# Patient Record
Sex: MALE | Race: WHITE | NOT HISPANIC OR LATINO | Employment: FULL TIME | URBAN - METROPOLITAN AREA
[De-identification: names, ages, dates, MRNs, and addresses within clinical notes are randomized per-mention and may not be internally consistent; named-entity substitution may affect disease eponyms.]

---
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
## 2020-02-05 ENCOUNTER — APPOINTMENT (OUTPATIENT)
Dept: LAB | Facility: HOSPITAL | Age: 20
End: 2020-02-05

## 2020-02-05 ENCOUNTER — OFFICE VISIT (OUTPATIENT)
Dept: FAMILY MEDICINE CLINIC | Facility: CLINIC | Age: 20
End: 2020-02-05

## 2020-02-05 ENCOUNTER — HOSPITAL ENCOUNTER (OUTPATIENT)
Dept: CT IMAGING | Facility: HOSPITAL | Age: 20
Discharge: HOME | End: 2020-02-05
Admitting: NURSE PRACTITIONER

## 2020-02-05 ENCOUNTER — HOSPITAL ENCOUNTER (EMERGENCY)
Facility: HOSPITAL | Age: 20
End: 2020-02-05

## 2020-02-05 VITALS
OXYGEN SATURATION: 98 % | HEART RATE: 72 BPM | TEMPERATURE: 97.5 F | DIASTOLIC BLOOD PRESSURE: 88 MMHG | BODY MASS INDEX: 28.38 KG/M2 | RESPIRATION RATE: 16 BRPM | SYSTOLIC BLOOD PRESSURE: 135 MMHG | HEIGHT: 70 IN

## 2020-02-05 VITALS
HEIGHT: 70 IN | OXYGEN SATURATION: 98 % | SYSTOLIC BLOOD PRESSURE: 134 MMHG | HEART RATE: 78 BPM | BODY MASS INDEX: 28.32 KG/M2 | DIASTOLIC BLOOD PRESSURE: 78 MMHG | TEMPERATURE: 98.1 F | WEIGHT: 197.8 LBS

## 2020-02-05 DIAGNOSIS — J30.89 NON-SEASONAL ALLERGIC RHINITIS, UNSPECIFIED TRIGGER: ICD-10-CM

## 2020-02-05 DIAGNOSIS — R10.31 RIGHT LOWER QUADRANT ABDOMINAL PAIN: Primary | ICD-10-CM

## 2020-02-05 LAB
ANION GAP SERPL CALCULATED.3IONS-SCNC: 9.8 MMOL/L (ref 5–15)
BASOPHILS # BLD AUTO: 0.02 10*3/MM3 (ref 0–0.2)
BASOPHILS NFR BLD AUTO: 0.2 % (ref 0–1.5)
BILIRUB BLD-MCNC: NEGATIVE MG/DL
BUN BLD-MCNC: 19 MG/DL (ref 6–20)
BUN/CREAT SERPL: 21.3 (ref 7–25)
CALCIUM SPEC-SCNC: 9.7 MG/DL (ref 8.6–10.5)
CHLORIDE SERPL-SCNC: 99 MMOL/L (ref 98–107)
CLARITY, POC: CLEAR
CO2 SERPL-SCNC: 31.2 MMOL/L (ref 22–29)
COLOR UR: YELLOW
CREAT BLD-MCNC: 0.89 MG/DL (ref 0.76–1.27)
DEPRECATED RDW RBC AUTO: 37.2 FL (ref 37–54)
EOSINOPHIL # BLD AUTO: 0.08 10*3/MM3 (ref 0–0.4)
EOSINOPHIL NFR BLD AUTO: 0.7 % (ref 0.3–6.2)
ERYTHROCYTE [DISTWIDTH] IN BLOOD BY AUTOMATED COUNT: 11.4 % (ref 12.3–15.4)
GFR SERPL CREATININE-BSD FRML MDRD: 110 ML/MIN/1.73
GLUCOSE BLD-MCNC: 91 MG/DL (ref 65–99)
GLUCOSE UR STRIP-MCNC: NEGATIVE MG/DL
HCT VFR BLD AUTO: 42.4 % (ref 37.5–51)
HGB BLD-MCNC: 14.5 G/DL (ref 13–17.7)
IMM GRANULOCYTES # BLD AUTO: 0.04 10*3/MM3 (ref 0–0.05)
IMM GRANULOCYTES NFR BLD AUTO: 0.3 % (ref 0–0.5)
KETONES UR QL: NEGATIVE
LEUKOCYTE EST, POC: NEGATIVE
LYMPHOCYTES # BLD AUTO: 1.61 10*3/MM3 (ref 0.7–3.1)
LYMPHOCYTES NFR BLD AUTO: 13.5 % (ref 19.6–45.3)
MCH RBC QN AUTO: 30.7 PG (ref 26.6–33)
MCHC RBC AUTO-ENTMCNC: 34.2 G/DL (ref 31.5–35.7)
MCV RBC AUTO: 89.6 FL (ref 79–97)
MONOCYTES # BLD AUTO: 0.98 10*3/MM3 (ref 0.1–0.9)
MONOCYTES NFR BLD AUTO: 8.2 % (ref 5–12)
NEUTROPHILS # BLD AUTO: 9.24 10*3/MM3 (ref 1.7–7)
NEUTROPHILS NFR BLD AUTO: 77.1 % (ref 42.7–76)
NITRITE UR-MCNC: NEGATIVE MG/ML
NRBC BLD AUTO-RTO: 0 /100 WBC (ref 0–0.2)
PH UR: 7 [PH] (ref 5–8)
PLATELET # BLD AUTO: 205 10*3/MM3 (ref 140–450)
PMV BLD AUTO: 9.4 FL (ref 6–12)
POTASSIUM BLD-SCNC: 4.2 MMOL/L (ref 3.5–5.2)
PROT UR STRIP-MCNC: NEGATIVE MG/DL
RBC # BLD AUTO: 4.73 10*6/MM3 (ref 4.14–5.8)
RBC # UR STRIP: NEGATIVE /UL
SODIUM BLD-SCNC: 140 MMOL/L (ref 136–145)
SP GR UR: 1 (ref 1–1.03)
UROBILINOGEN UR QL: NORMAL
WBC NRBC COR # BLD: 11.97 10*3/MM3 (ref 3.4–10.8)

## 2020-02-05 PROCEDURE — 74177 CT ABD & PELVIS W/CONTRAST: CPT

## 2020-02-05 PROCEDURE — 85025 COMPLETE CBC W/AUTO DIFF WBC: CPT | Performed by: NURSE PRACTITIONER

## 2020-02-05 PROCEDURE — 80048 BASIC METABOLIC PNL TOTAL CA: CPT | Performed by: NURSE PRACTITIONER

## 2020-02-05 PROCEDURE — 25010000002 IOPAMIDOL 61 % SOLUTION: Performed by: NURSE PRACTITIONER

## 2020-02-05 PROCEDURE — 99214 OFFICE O/P EST MOD 30 MIN: CPT | Performed by: NURSE PRACTITIONER

## 2020-02-05 PROCEDURE — 81003 URINALYSIS AUTO W/O SCOPE: CPT | Performed by: NURSE PRACTITIONER

## 2020-02-05 PROCEDURE — 36415 COLL VENOUS BLD VENIPUNCTURE: CPT | Performed by: NURSE PRACTITIONER

## 2020-02-05 RX ADMIN — IOPAMIDOL 85 ML: 612 INJECTION, SOLUTION INTRAVENOUS at 14:10

## 2020-02-05 NOTE — PROGRESS NOTES
"Subjective   Hua Medina is a 19 y.o. male.     Chief Complaint   Patient presents with   • Abdominal Pain     lower/right side constant sharp pain    • Back Pain     lower/right side       HPI  Here for acute RLQ pain. Noticed some vague tenderness there for couple of weeks but didn't really pay much attention. Last night he developed worsening and sharper pain which woke him up last night.  He also has had nausea since last night but no vomiting and has not had any appetite.     Has his appendix, not had other surgeries.       Social History     Tobacco Use   • Smoking status: Never Smoker   • Smokeless tobacco: Never Used   Substance Use Topics   • Alcohol use: No     Frequency: Never   • Drug use: No       The following portions of the patient's history were reviewed and updated as appropriate: allergies, current medications, past family history, past medical history, past social history, past surgical history and problem list.    Review of Systems   Constitutional: Positive for appetite change. Negative for chills and fever.   HENT: Positive for rhinorrhea (constant).    Respiratory: Negative for cough and shortness of breath.    Cardiovascular: Negative for chest pain and palpitations.   Gastrointestinal: Negative for abdominal distention, anal bleeding, blood in stool, constipation, diarrhea, rectal pain and vomiting.   Genitourinary: Positive for dysuria (couple of weeks ago, but resolved on its own). Negative for decreased urine volume, discharge, flank pain, genital sores, hematuria, scrotal swelling, testicular pain and urgency.   Musculoskeletal: Positive for back pain (intermittent Right lower back pain worsened since RLQ pain began).   Allergic/Immunologic: Positive for environmental allergies.   Neurological: Negative for weakness and headaches.       Objective   Blood pressure 134/78, pulse 78, temperature 98.1 °F (36.7 °C), temperature source Oral, height 177.8 cm (70\"), weight 89.7 kg (197 lb " 12.8 oz), SpO2 98 %.  Body mass index is 28.38 kg/m².    Physical Exam   Constitutional: He is oriented to person, place, and time. He appears well-developed and well-nourished. No distress.   Non toxic   HENT:   Head: Normocephalic and atraumatic.   Raised lesion right distal nare with small scab     Eyes: Conjunctivae are normal. Right eye exhibits no discharge. Left eye exhibits no discharge.   Cardiovascular: Normal rate and regular rhythm.   Pulmonary/Chest: Effort normal and breath sounds normal.   Abdominal: Soft. Normal appearance. He exhibits no distension. Bowel sounds are decreased. There is tenderness in the right lower quadrant. There is rebound. There is no guarding and no CVA tenderness.   Reported pain at McBurneys point but pain not exquisite  Mildly + Rosvings sign  Mildly positive drop heel  Mildly + obturator sign    ,   Musculoskeletal: He exhibits no deformity.   Gait smooth and steady   Neurological: He is alert and oriented to person, place, and time.   Skin: Skin is warm and dry. He is not diaphoretic.   Psychiatric: He has a normal mood and affect.   Nursing note and vitals reviewed.      Assessment   Problem List Items Addressed This Visit     None      Visit Diagnoses     Right lower quadrant abdominal pain    -  Primary    Relevant Orders    POC Urinalysis Dipstick, Automated (Completed)    CBC & Differential (Completed)    Basic Metabolic Panel (Completed)    CT Abdomen Pelvis With Contrast    CBC Auto Differential (Completed)    Non-seasonal allergic rhinitis, unspecified trigger               Procedures           Impression and Plan:  Villagomez score of 8-needs further eval for appendicitis.  Stat CT with IV contrast of abd/pelvis ordered.      UA was negative.  BMP and CBC done while here-BMP was normal and CBC shows mild leukocytosis with increased neutrophils.    Pt was scheduled for CT this evening had been NPO but then had protein shake in waiting room.  He has been instructed to  remain NPO until CT scan.  If he worsens he should go immediately to ER.      Right nare with scab vs polyp-will have him apply abx ointment TID for few days, avoid harsh blowing, wiping to see if scab heals. Recommend OTC cetirizine for rhinorrhea.   May need ENT eval if continues to be problem.     Health Maintenance Due   Topic Date Due   • ANNUAL PHYSICAL  05/05/2003   • HPV VACCINES (1 - Male 2-dose series) 05/05/2011   • TDAP/TD VACCINES (1 - Tdap) 05/05/2011   • INFLUENZA VACCINE  08/01/2019       Late entry:  Results of CT scan which was negative called to patient when verbal report called.  Discussed monitoring, s/s requiring emergent tx.  Will have him f/u if no better.        EMR Dragon/Transcription disclaimer:   Much of this encounter note is an electronic transcription/translation of spoken language to printed text. The electronic translation of spoken language may permit erroneous, or at times, nonsensical words or phrases to be inadvertently transcribed; Although I have reviewed the note for such errors, some may still exist.

## 2020-02-05 NOTE — NURSING NOTE
Dr. Crain phoned here and stated CT is negative.  Brit Cruz phoned and informed of this result. She then spoke to patient directly on the telephone and told him that he could be discharged from the hospital and to follow up with her if his symptoms worsen, IV out, ambulatory with family member toward the exit.

## 2020-07-22 ENCOUNTER — OFFICE VISIT (OUTPATIENT)
Dept: FAMILY MEDICINE CLINIC | Facility: CLINIC | Age: 20
End: 2020-07-22

## 2020-07-22 VITALS
SYSTOLIC BLOOD PRESSURE: 110 MMHG | WEIGHT: 204 LBS | TEMPERATURE: 97.7 F | HEIGHT: 70 IN | DIASTOLIC BLOOD PRESSURE: 62 MMHG | HEART RATE: 73 BPM | BODY MASS INDEX: 29.2 KG/M2 | OXYGEN SATURATION: 98 %

## 2020-07-22 DIAGNOSIS — K05.00 GINGIVITIS, ACUTE: ICD-10-CM

## 2020-07-22 DIAGNOSIS — J02.9 PHARYNGITIS, UNSPECIFIED ETIOLOGY: ICD-10-CM

## 2020-07-22 DIAGNOSIS — R59.0 CERVICAL ADENOPATHY: Primary | ICD-10-CM

## 2020-07-22 PROCEDURE — 99213 OFFICE O/P EST LOW 20 MIN: CPT | Performed by: NURSE PRACTITIONER

## 2020-07-22 RX ORDER — AMOXICILLIN 500 MG/1
500 CAPSULE ORAL 2 TIMES DAILY
Qty: 20 CAPSULE | Refills: 0 | Status: SHIPPED | OUTPATIENT
Start: 2020-07-22 | End: 2020-07-28

## 2020-07-22 NOTE — PROGRESS NOTES
"Suki Medina is a 20 y.o. male.     Chief Complaint   Patient presents with   • Edema     C/o neck swelling on the left side x2 days, denies any injury, complains of drainage      HPI  Here for onset of Left ant cervical lymph node becoming  swollen and painful on  Monday, Developed sore throat last night. No dental problems and gets regular check ups.  Brushes but does not floss. He does not note any sinus drainage just thinks he must since he has a sore throat.     Denies COVID exposure or sick contacts.  Does work out at gym 4-5 days per week.  Wears mask in and out but not during lifting.  Tries to practice social distancing.        Social History     Tobacco Use   • Smoking status: Never Smoker   • Smokeless tobacco: Never Used   Substance Use Topics   • Alcohol use: No     Frequency: Never   • Drug use: No       The following portions of the patient's history were reviewed and updated as appropriate: allergies, current medications, past family history, past medical history, past social history, past surgical history and problem list.    Review of Systems   Constitutional: Negative for activity change, appetite change, chills and fever.   HENT: Positive for sore throat and trouble swallowing. Negative for congestion, ear discharge, ear pain, facial swelling, postnasal drip, rhinorrhea, sinus pressure and sinus pain.    Respiratory: Negative for cough and shortness of breath.    Cardiovascular: Negative for chest pain and palpitations.   Gastrointestinal: Negative for abdominal pain, diarrhea, nausea and vomiting.   Musculoskeletal: Negative for arthralgias and myalgias.   Neurological: Negative for weakness and headaches.   Hematological: Positive for adenopathy.       Objective   Blood pressure 110/62, pulse 73, temperature 97.7 °F (36.5 °C), height 177.8 cm (70\"), weight 92.5 kg (204 lb), SpO2 98 %.  Body mass index is 29.27 kg/m².    Physical Exam   Constitutional: He is oriented to person, " place, and time. He appears well-developed and well-nourished. No distress.   Mildly ill appearing-face is a little flushed   HENT:   Head: Normocephalic and atraumatic.   Right Ear: External ear normal. Tympanic membrane is erythematous. A middle ear effusion is present.   Left Ear: External ear normal. A middle ear effusion is present.   Mouth/Throat: Posterior oropharyngeal erythema present. Tonsils are 1+ on the right. Tonsils are 1+ on the left. No tonsillar exudate.   Eyes: Conjunctivae are normal. Right eye exhibits no discharge. Left eye exhibits no discharge.   Neck: Neck supple.   Cardiovascular: Normal rate, regular rhythm and normal heart sounds.   Pulmonary/Chest: Effort normal and breath sounds normal.   Abdominal: Soft. Bowel sounds are normal. There is no tenderness.   Musculoskeletal: He exhibits no deformity.   Gait smooth and steady   Lymphadenopathy:     He has cervical adenopathy (left ant cervical).   Neurological: He is alert and oriented to person, place, and time.   Skin: Skin is warm and dry. He is not diaphoretic.   Psychiatric: He has a normal mood and affect.   Nursing note and vitals reviewed.      Assessment   Problem List Items Addressed This Visit     None      Visit Diagnoses     Cervical adenopathy    -  Primary    Relevant Medications    amoxicillin (AMOXIL) 500 MG capsule    Pharyngitis, unspecified etiology        Relevant Medications    amoxicillin (AMOXIL) 500 MG capsule    Other Relevant Orders    COVID-19,[facility],NP/OP Swab in Transport Media or ESwab 72 HR TAT - Swab, Nasopharynx    Gingivitis, acute               Procedures           Impression and Plan: Covid test due to pharyngitis. Will give amox for LAD and has swollen left upper molar gum. Oral hygiene and gingivitis management discussed. If he develops any oral pain he needs to contact his dentist.     Should quarantine until COVID test is back.  We discussed this today.     He is due for annual and would like to  discuss ADD eval, anxiety, sleep problems.       Health Maintenance Due   Topic Date Due   • ANNUAL PHYSICAL  05/05/2003   • HPV VACCINES (1 - Male 2-dose series) 05/05/2011   • TDAP/TD VACCINES (1 - Tdap) 05/05/2011   • HEPATITIS C SCREENING  08/02/2018              EMR Dragon/Transcription disclaimer:   Much of this encounter note is an electronic transcription/translation of spoken language to printed text. The electronic translation of spoken language may permit erroneous, or at times, nonsensical words or phrases to be inadvertently transcribed; Although I have reviewed the note for such errors, some may still exist.

## 2020-07-22 NOTE — PATIENT INSTRUCTIONS

## 2020-07-28 ENCOUNTER — OFFICE VISIT (OUTPATIENT)
Dept: FAMILY MEDICINE CLINIC | Facility: CLINIC | Age: 20
End: 2020-07-28

## 2020-07-28 ENCOUNTER — TELEPHONE (OUTPATIENT)
Dept: FAMILY MEDICINE CLINIC | Facility: CLINIC | Age: 20
End: 2020-07-28

## 2020-07-28 VITALS
SYSTOLIC BLOOD PRESSURE: 110 MMHG | WEIGHT: 201.1 LBS | TEMPERATURE: 97.5 F | OXYGEN SATURATION: 96 % | HEIGHT: 70 IN | DIASTOLIC BLOOD PRESSURE: 80 MMHG | BODY MASS INDEX: 28.79 KG/M2 | HEART RATE: 75 BPM

## 2020-07-28 DIAGNOSIS — J02.9 PHARYNGITIS, UNSPECIFIED ETIOLOGY: Primary | ICD-10-CM

## 2020-07-28 PROCEDURE — 99213 OFFICE O/P EST LOW 20 MIN: CPT | Performed by: NURSE PRACTITIONER

## 2020-07-28 RX ORDER — AMOXICILLIN AND CLAVULANATE POTASSIUM 875; 125 MG/1; MG/1
1 TABLET, FILM COATED ORAL 2 TIMES DAILY
Qty: 14 TABLET | Refills: 0 | Status: SHIPPED | OUTPATIENT
Start: 2020-07-28 | End: 2020-11-30

## 2020-07-28 NOTE — PROGRESS NOTES
"Subjective   Hua Medina is a 20 y.o. male.     Chief Complaint   Patient presents with   • Sore Throat     COVID negative but still has sore throat. Needs note for work      HPI patient returns today for continued sore throat.  He was here on 722 for same.  He feels that his throat is more swollen and that his uvula is swollen.  He was tested for COVID which was negative.  He has taken about half of the amoxicillin and feels that his pharyngitis has improved with amox, but still not resolving as he would expect.  He feels like he still has left anterior cervical lymphadenopathy and a sore throat deep in his throat.  He denies fever chills, fatigue, earache, dental disease.  He denies any sick contacts.  He is requesting a different antibiotic.    Social History     Tobacco Use   • Smoking status: Never Smoker   • Smokeless tobacco: Never Used   Substance Use Topics   • Alcohol use: No     Frequency: Never   • Drug use: No       The following portions of the patient's history were reviewed and updated as appropriate: allergies, current medications, past family history, past medical history, past social history, past surgical history and problem list.    Review of Systems   Constitutional: Negative for activity change and appetite change.   HENT: Negative for congestion, drooling, ear discharge, ear pain, facial swelling, postnasal drip, rhinorrhea, sinus pressure, sinus pain and trouble swallowing.    Eyes: Negative for discharge, redness and itching.   Respiratory: Negative for cough, chest tightness and shortness of breath.    Cardiovascular: Negative for chest pain and palpitations.   Gastrointestinal: Negative for abdominal pain, diarrhea, nausea and vomiting.   Musculoskeletal: Negative for arthralgias and myalgias.   Neurological: Negative for weakness and headaches.       Objective   Blood pressure 110/80, pulse 75, temperature 97.5 °F (36.4 °C), temperature source Tympanic, height 177.8 cm (70\"), weight " 91.2 kg (201 lb 1.6 oz), SpO2 96 %.  Body mass index is 28.85 kg/m².    Physical Exam   Constitutional: He is oriented to person, place, and time. He appears well-developed and well-nourished. No distress.   HENT:   Head: Normocephalic and atraumatic.   Right Ear: Tympanic membrane, external ear and ear canal normal.   Left Ear: Tympanic membrane, external ear and ear canal normal.   Mouth/Throat: No oral lesions. No trismus in the jaw. No uvula swelling. Posterior oropharyngeal erythema present. No oropharyngeal exudate, posterior oropharyngeal edema or tonsillar abscesses. Tonsils are 1+ on the right. Tonsils are 1+ on the left. No tonsillar exudate.   Eyes: Conjunctivae are normal. Right eye exhibits no discharge. Left eye exhibits no discharge.   Neck: Neck supple.   Cardiovascular: Normal rate, regular rhythm and normal heart sounds.   Pulmonary/Chest: Effort normal and breath sounds normal.   Abdominal: Soft. Bowel sounds are normal. He exhibits no distension and no mass. There is no hepatosplenomegaly. There is no tenderness. There is no rebound and no guarding.   Musculoskeletal: He exhibits no deformity.   Gait smooth and steady   Lymphadenopathy:     He has cervical adenopathy (Left anterior cervical).   Neurological: He is alert and oriented to person, place, and time.   Skin: Skin is warm and dry. He is not diaphoretic.   Psychiatric: He has a normal mood and affect.   Nursing note and vitals reviewed.      Assessment   Problem List Items Addressed This Visit     None      Visit Diagnoses     Pharyngitis, unspecified etiology    -  Primary    Relevant Medications    amoxicillin-clavulanate (AUGMENTIN) 875-125 MG per tablet           Procedures           Impression and Plan: Pharyngitis: He was COVID negative.  He was improving a little bit on the amoxicillin, requesting new antibiotic.  I did discuss with patient that this most likely is viral.  His uvula does not appear to be swollen, it is mildly  erythematous as is his throat.  After long discussion we can broaden antibiotics to Augmentin.  We discussed the risk of C. difficile colitis and generalized diarrhea.  He does like yogurt so he will eat a yogurt daily.  If he is not improving or certainly if he worsens he will let me know or seek emergent treatment if he has difficulty breathing.      Health Maintenance Due   Topic Date Due   • ANNUAL PHYSICAL  05/05/2003   • HPV VACCINES (1 - Male 2-dose series) 05/05/2011   • TDAP/TD VACCINES (1 - Tdap) 05/05/2011   • HEPATITIS C SCREENING  08/02/2018              EMR Dragon/Transcription disclaimer:   Much of this encounter note is an electronic transcription/translation of spoken language to printed text. The electronic translation of spoken language may permit erroneous, or at times, nonsensical words or phrases to be inadvertently transcribed; Although I have reviewed the note for such errors, some may still exist.

## 2020-07-28 NOTE — TELEPHONE ENCOUNTER
Patient called in stating Anthony advised him to have a covid test done, the results came back negative. Patient said he's still having symptoms, he would like a doctors note advising he shouldn't go to work until his symptoms clear. Patient would like to  today.    Best call back # [phone]

## 2020-07-28 NOTE — PATIENT INSTRUCTIONS
Pharyngitis    Pharyngitis is a sore throat (pharynx). This is when there is redness, pain, and swelling in your throat. Most of the time, this condition gets better on its own. In some cases, you may need medicine.  Follow these instructions at home:  · Take over-the-counter and prescription medicines only as told by your doctor.  ? If you were prescribed an antibiotic medicine, take it as told by your doctor. Do not stop taking the antibiotic even if you start to feel better.  ? Do not give children aspirin. Aspirin has been linked to Reye syndrome.  · Drink enough water and fluids to keep your pee (urine) clear or pale yellow.  · Get a lot of rest.  · Rinse your mouth (gargle) with a salt-water mixture 3-4 times a day or as needed. To make a salt-water mixture, completely dissolve ½-1 tsp of salt in 1 cup of warm water.  · If your doctor approves, you may use throat lozenges or sprays to soothe your throat.  Contact a doctor if:  · You have large, tender lumps in your neck.  · You have a rash.  · You cough up green, yellow-brown, or bloody spit.  Get help right away if:  · You have a stiff neck.  · You drool or cannot swallow liquids.  · You cannot drink or take medicines without throwing up.  · You have very bad pain that does not go away with medicine.  · You have problems breathing, and it is not from a stuffy nose.  · You have new pain and swelling in your knees, ankles, wrists, or elbows.  Summary  · Pharyngitis is a sore throat (pharynx). This is when there is redness, pain, and swelling in your throat.  · If you were prescribed an antibiotic medicine, take it as told by your doctor. Do not stop taking the antibiotic even if you start to feel better.  · Most of the time, pharyngitis gets better on its own. Sometimes, you may need medicine.  This information is not intended to replace advice given to you by your health care provider. Make sure you discuss any questions you have with your health care  provider.  Document Released: 06/05/2009 Document Revised: 11/30/2018 Document Reviewed: 01/23/2018  Elsevier Patient Education © 2020 [employer]

## 2020-11-28 ENCOUNTER — HOSPITAL ENCOUNTER (EMERGENCY)
Facility: HOSPITAL | Age: 20
Discharge: HOME | End: 2020-11-28
Attending: EMERGENCY MEDICINE | Admitting: EMERGENCY MEDICINE

## 2020-11-28 ENCOUNTER — APPOINTMENT (OUTPATIENT)
Dept: GENERAL RADIOLOGY | Facility: HOSPITAL | Age: 20
End: 2020-11-28

## 2020-11-28 VITALS
OXYGEN SATURATION: 97 % | HEART RATE: 82 BPM | TEMPERATURE: 99.6 F | BODY MASS INDEX: 30.78 KG/M2 | DIASTOLIC BLOOD PRESSURE: 75 MMHG | HEIGHT: 70 IN | SYSTOLIC BLOOD PRESSURE: 159 MMHG | RESPIRATION RATE: 16 BRPM | WEIGHT: 215 LBS

## 2020-11-28 DIAGNOSIS — D72.819 LEUKOPENIA, UNSPECIFIED TYPE: ICD-10-CM

## 2020-11-28 DIAGNOSIS — E87.1 HYPONATREMIA: ICD-10-CM

## 2020-11-28 DIAGNOSIS — D69.6 THROMBOCYTOPENIA (HCC): ICD-10-CM

## 2020-11-28 DIAGNOSIS — B34.9 VIRAL SYNDROME: Primary | ICD-10-CM

## 2020-11-28 LAB
ALBUMIN SERPL-MCNC: 4 G/DL (ref 3.5–5.2)
ALBUMIN/GLOB SERPL: 1.4 G/DL
ALP SERPL-CCNC: 63 U/L (ref 39–117)
ALT SERPL W P-5'-P-CCNC: 45 U/L (ref 1–41)
ANION GAP SERPL CALCULATED.3IONS-SCNC: 5.2 MMOL/L (ref 5–15)
AST SERPL-CCNC: 45 U/L (ref 1–40)
B PARAPERT DNA SPEC QL NAA+PROBE: NOT DETECTED
B PERT DNA SPEC QL NAA+PROBE: NOT DETECTED
BASOPHILS # BLD AUTO: 0.01 10*3/MM3 (ref 0–0.2)
BASOPHILS NFR BLD AUTO: 0.4 % (ref 0–1.5)
BILIRUB SERPL-MCNC: 0.4 MG/DL (ref 0–1.2)
BUN SERPL-MCNC: 13 MG/DL (ref 6–20)
BUN/CREAT SERPL: 12.4 (ref 7–25)
C PNEUM DNA NPH QL NAA+NON-PROBE: NOT DETECTED
CALCIUM SPEC-SCNC: 8.8 MG/DL (ref 8.6–10.5)
CHLORIDE SERPL-SCNC: 99 MMOL/L (ref 98–107)
CO2 SERPL-SCNC: 30.8 MMOL/L (ref 22–29)
CREAT SERPL-MCNC: 1.05 MG/DL (ref 0.76–1.27)
D-LACTATE SERPL-SCNC: 1.4 MMOL/L (ref 0.5–2)
DEPRECATED RDW RBC AUTO: 36.9 FL (ref 37–54)
EOSINOPHIL # BLD AUTO: 0.01 10*3/MM3 (ref 0–0.4)
EOSINOPHIL NFR BLD AUTO: 0.4 % (ref 0.3–6.2)
ERYTHROCYTE [DISTWIDTH] IN BLOOD BY AUTOMATED COUNT: 11.6 % (ref 12.3–15.4)
FLUAV SUBTYP SPEC NAA+PROBE: NOT DETECTED
FLUBV RNA ISLT QL NAA+PROBE: NOT DETECTED
GFR SERPL CREATININE-BSD FRML MDRD: 90 ML/MIN/1.73
GLOBULIN UR ELPH-MCNC: 2.9 GM/DL
GLUCOSE SERPL-MCNC: 104 MG/DL (ref 65–99)
HADV DNA SPEC NAA+PROBE: NOT DETECTED
HCOV 229E RNA SPEC QL NAA+PROBE: NOT DETECTED
HCOV HKU1 RNA SPEC QL NAA+PROBE: NOT DETECTED
HCOV NL63 RNA SPEC QL NAA+PROBE: NOT DETECTED
HCOV OC43 RNA SPEC QL NAA+PROBE: NOT DETECTED
HCT VFR BLD AUTO: 43.3 % (ref 37.5–51)
HGB BLD-MCNC: 14.8 G/DL (ref 13–17.7)
HMPV RNA NPH QL NAA+NON-PROBE: NOT DETECTED
HPIV1 RNA SPEC QL NAA+PROBE: NOT DETECTED
HPIV2 RNA SPEC QL NAA+PROBE: NOT DETECTED
HPIV3 RNA NPH QL NAA+PROBE: NOT DETECTED
HPIV4 P GENE NPH QL NAA+PROBE: NOT DETECTED
IMM GRANULOCYTES # BLD AUTO: 0.01 10*3/MM3 (ref 0–0.05)
IMM GRANULOCYTES NFR BLD AUTO: 0.4 % (ref 0–0.5)
LYMPHOCYTES # BLD AUTO: 0.46 10*3/MM3 (ref 0.7–3.1)
LYMPHOCYTES NFR BLD AUTO: 19.7 % (ref 19.6–45.3)
M PNEUMO IGG SER IA-ACNC: NOT DETECTED
MCH RBC QN AUTO: 30.1 PG (ref 26.6–33)
MCHC RBC AUTO-ENTMCNC: 34.2 G/DL (ref 31.5–35.7)
MCV RBC AUTO: 88.2 FL (ref 79–97)
MONOCYTES # BLD AUTO: 0.43 10*3/MM3 (ref 0.1–0.9)
MONOCYTES NFR BLD AUTO: 18.5 % (ref 5–12)
NEUTROPHILS NFR BLD AUTO: 1.41 10*3/MM3 (ref 1.7–7)
NEUTROPHILS NFR BLD AUTO: 60.6 % (ref 42.7–76)
NRBC BLD AUTO-RTO: 0 /100 WBC (ref 0–0.2)
PLATELET # BLD AUTO: 106 10*3/MM3 (ref 140–450)
PMV BLD AUTO: 9.2 FL (ref 6–12)
POTASSIUM SERPL-SCNC: 4.2 MMOL/L (ref 3.5–5.2)
PROCALCITONIN SERPL-MCNC: 0.24 NG/ML (ref 0–0.25)
PROT SERPL-MCNC: 6.9 G/DL (ref 6–8.5)
RBC # BLD AUTO: 4.91 10*6/MM3 (ref 4.14–5.8)
RHINOVIRUS RNA SPEC NAA+PROBE: NOT DETECTED
RSV RNA NPH QL NAA+NON-PROBE: NOT DETECTED
SARS-COV-2 RNA NPH QL NAA+NON-PROBE: NOT DETECTED
SODIUM SERPL-SCNC: 135 MMOL/L (ref 136–145)
WBC # BLD AUTO: 2.33 10*3/MM3 (ref 3.4–10.8)

## 2020-11-28 PROCEDURE — 84145 PROCALCITONIN (PCT): CPT | Performed by: EMERGENCY MEDICINE

## 2020-11-28 PROCEDURE — 0202U NFCT DS 22 TRGT SARS-COV-2: CPT | Performed by: EMERGENCY MEDICINE

## 2020-11-28 PROCEDURE — 71045 X-RAY EXAM CHEST 1 VIEW: CPT

## 2020-11-28 PROCEDURE — 83605 ASSAY OF LACTIC ACID: CPT | Performed by: EMERGENCY MEDICINE

## 2020-11-28 PROCEDURE — 87040 BLOOD CULTURE FOR BACTERIA: CPT | Performed by: EMERGENCY MEDICINE

## 2020-11-28 PROCEDURE — 85025 COMPLETE CBC W/AUTO DIFF WBC: CPT | Performed by: EMERGENCY MEDICINE

## 2020-11-28 PROCEDURE — 80053 COMPREHEN METABOLIC PANEL: CPT | Performed by: EMERGENCY MEDICINE

## 2020-11-28 PROCEDURE — 99284 EMERGENCY DEPT VISIT MOD MDM: CPT

## 2020-11-28 RX ORDER — SODIUM CHLORIDE 0.9 % (FLUSH) 0.9 %
10 SYRINGE (ML) INJECTION AS NEEDED
Status: DISCONTINUED | OUTPATIENT
Start: 2020-11-28 | End: 2020-11-28 | Stop reason: HOSPADM

## 2020-11-28 RX ADMIN — SODIUM CHLORIDE 1000 ML: 9 INJECTION, SOLUTION INTRAVENOUS at 15:35

## 2020-11-30 ENCOUNTER — TELEMEDICINE (OUTPATIENT)
Dept: FAMILY MEDICINE CLINIC | Facility: CLINIC | Age: 20
End: 2020-11-30

## 2020-11-30 DIAGNOSIS — G43.C0 PERIODIC HEADACHE SYNDROME, NOT INTRACTABLE: ICD-10-CM

## 2020-11-30 DIAGNOSIS — R50.9 FEVER OF UNKNOWN ORIGIN: Primary | ICD-10-CM

## 2020-11-30 PROCEDURE — 99214 OFFICE O/P EST MOD 30 MIN: CPT | Performed by: NURSE PRACTITIONER

## 2020-11-30 RX ORDER — DOXYCYCLINE HYCLATE 100 MG/1
100 CAPSULE ORAL 2 TIMES DAILY
Qty: 20 CAPSULE | Refills: 0 | Status: SHIPPED | OUTPATIENT
Start: 2020-11-30 | End: 2020-12-08

## 2020-11-30 RX ORDER — SUMATRIPTAN 50 MG/1
TABLET, FILM COATED ORAL
Qty: 20 TABLET | Refills: 0 | Status: SHIPPED | OUTPATIENT
Start: 2020-11-30

## 2020-11-30 RX ORDER — SUMATRIPTAN 50 MG/1
TABLET, FILM COATED ORAL
Qty: 1 TABLET | Refills: 0 | Status: SHIPPED | OUTPATIENT
Start: 2020-11-30 | End: 2020-11-30 | Stop reason: SDUPTHER

## 2020-12-01 ENCOUNTER — TELEPHONE (OUTPATIENT)
Dept: FAMILY MEDICINE CLINIC | Facility: CLINIC | Age: 20
End: 2020-12-01

## 2020-12-01 NOTE — TELEPHONE ENCOUNTER
Loren, the patients mother called in to let Anthony know the patient ended up in the hospital last night. Mom also wanted to know if the patients test results came back?    Best call back # [phone]

## 2020-12-02 ENCOUNTER — TELEPHONE (OUTPATIENT)
Dept: FAMILY MEDICINE CLINIC | Facility: CLINIC | Age: 20
End: 2020-12-02

## 2020-12-02 ENCOUNTER — TELEMEDICINE (OUTPATIENT)
Dept: FAMILY MEDICINE CLINIC | Facility: CLINIC | Age: 20
End: 2020-12-02

## 2020-12-02 DIAGNOSIS — R50.9 FEVER OF UNKNOWN ORIGIN: Primary | ICD-10-CM

## 2020-12-02 PROCEDURE — 99213 OFFICE O/P EST LOW 20 MIN: CPT | Performed by: NURSE PRACTITIONER

## 2020-12-02 NOTE — TELEPHONE ENCOUNTER
PATIENT IS MOTHER IS CALLING NEEDING A CALL BACK FROM AIDA RODRIGUEZ OR HER NURSE    PATIENT IS NOT TOLERATING THE MEDICATION THAT WAS PROSCRIBE AND HE STILL HAS A FEVER AT NIGHT HIS FEVER .    PLEASE CONTACT PATIENT MOTHER EFRA @[phone]

## 2020-12-02 NOTE — TELEPHONE ENCOUNTER
Please let her know that there are orders in for repeat labs for future-dont think he needs them until early next week.  If he is not tolerating the doxy then I would have her hold it for today.  So far blood cultures are negative.  His CXR and CT were negative. This appears to be viral.    Lets have him do a televisit tomorrow so I can take a look at him-SW

## 2020-12-02 NOTE — PROGRESS NOTES
Subjective   Hua Medina is a 20 y.o. male.     No chief complaint on file.     CC:  Fever    HPI patient and mom scheduled follow-up video visit for continued fever.  Patient has been running a fever that has been low-grade but typically spiking in the evening up to 103.  This has been ongoing since 1126.  Patient has had 2 ER visits and an urgent care visit, and a visit with myself via tele visit.  He has had negative work-ups but did have some abnormalities of his labs which are especially concerning to mom.  At his last televisit, we reviewed his lab work which showed leukopenia, neutropenia, lymphocytopenia, thrombocytopenia, and some increasing liver enzymes.  At that time I started him on doxycycline which mom reports he was not able to tolerate.  He took 3 doses and it just caused too much belly distress although he denies vomiting or diarrhea.    Mom took him back to the ER last night because his fever went up to 103.5 with Tylenol and ibuprofen alternating.  He also has an accompanying headache that feels almost like a migraine.  It worsens when he is spiking fevers.    No other family members have been sick and patient has had 4 - Covid tests.  Although his girlfriend was noted to be positive for Covid 10 days prior to his onset of symptoms.  He still has some mild and intermittent abdominal pain, nausea.  He has had a little bit of cough but no real shortness of breath.    This time he had a chest CT which was negative.  He had a slightly improved white blood cell count at  2.65.  He was not anemic.  His thrombocytopenia had also improved a little bit.  He had a nasal swab that was negative for flu, Covid still negative.  His lactic acid was within normal limits.  His strep was also negative.  He was released from the ER.      Mom has been giving him ibuprofen and or Tylenol pretty much around-the-clock to keep his fevers down.  She has been very concerned about the fevers and concern for seizure.  He  had had a previous childhood history of febrile seizures.  Also had a reported seizure around 2010 that was thought to be related to dehydration.  Not on any antiseizure medications.    Currently patient is feeling fatigued and has a headache.  He does admit he has not had any caffeine and usually drinks quite a bit of caffeine during the day.Fever is 99.8 currently.  He has not used his inhaler.  Mom is concerned because he does not seem to be eating and drinking as much as she feels he should.    Social History     Tobacco Use   • Smoking status: Never Smoker   • Smokeless tobacco: Never Used   Substance Use Topics   • Alcohol use: No     Frequency: Never   • Drug use: No       The following portions of the patient's history were reviewed and updated as appropriate: allergies, current medications, past family history, past medical history, past social history, past surgical history and problem list.    Review of Systems   Constitutional: Positive for activity change, appetite change and fatigue. Negative for chills.   HENT: Negative for congestion, ear pain, rhinorrhea, sore throat and trouble swallowing.    Eyes: Negative for photophobia, pain and visual disturbance.   Respiratory: Positive for cough. Negative for chest tightness, shortness of breath and wheezing.    Cardiovascular: Negative for palpitations and leg swelling.   Gastrointestinal: Positive for nausea. Negative for abdominal pain, diarrhea and vomiting.   Genitourinary: Negative for discharge, dysuria and urgency.   Musculoskeletal: Negative for arthralgias, myalgias, neck pain and neck stiffness.   Skin: Negative for rash.   Neurological: Positive for weakness, light-headedness and headaches. Negative for dizziness.       Objective   There were no vitals taken for this visit.  There is no height or weight on file to calculate BMI.    Physical Exam  Limited by video visit.  Pt  is ill appearing but not toxic appearing and does not seem to be  distressed.  He does not appear to be more sick than televisit we had on 11/30.  He seems alert and oriented, answers questions appropriately, and his mood and affect are normal, good historian of medical history.  No cough or dyspnea appreciated, able to complete sentences without problem.  He is a little weak appearing.  He is up in bed during visit with mom at bedside.  His lips are not dry, does not appear dehydrated.  He is able to move his neck without pain or stiffness.  He does point across his forehead and top of head as to location of headache.      Assessment   Problem List Items Addressed This Visit     None      Visit Diagnoses     Fever of unknown origin    -  Primary           Procedures           Impression and Plan: He has been running a low-grade temp tonight after spiking his highest fever yesterday and second ER visit.  His labs appear to be starting to rebound.  He has had negative blood cultures and all tests have been negative.  As I discussed with mom at his last televisit on 11/30, his labs appear more viral.  His worsening nausea and lack of appetite may be due to the quite a bit of ibuprofen and Tylenol for fever on a mostly empty stomach.  I have asked mom to avoid treating a fever until it is 101 or more.  That way we can monitor his fever curve better.  His liver enzymes may have been trending of additionally because of all the Tylenol he has been taking.  I am not too concerned about him eating right now but do want him to push fluids.  Some of his headache may be caffeine withdrawals and we discussed having some coke to see if that helps improve his headache.  He has Imitrex at the pharmacy waiting to be picked up to use.    We reviewed the negative CT of his chest and lab work again that was done last night in the ED.    Mom does admit that patient seems to get very sick when he gets sick.  Even when other family members just seem to have a more mild illness.  She reports this has  been a trend most of his life.  We did discuss possibly doing some immunologic testing for CVID possibly as he recovers.    I do not think antibiotics at this point will be helpful and he did not tolerate the doxycycline.  If he continues to remain sick and spiking high fevers we should probably do some tick serology which I discussed with mom.  I do have some follow-up blood work for them to do next week just to make sure his lab work is recovering.    If he has any changes or worsening they may need to go back to the emergency room again, but I think he is getting to the other side of his illness.  I will have them do a follow-up televisit early next week just to make sure he is doing well.  But they can send me a message via [employer] or call if they have any problems in the meantime.    We discussed signs and symptoms that would require emergent treatment.        Health Maintenance Due   Topic Date Due   • ANNUAL PHYSICAL  05/05/2003   • Pneumococcal Vaccine 0-64 (1 of 1 - PPSV23) 05/05/2006   • HPV VACCINES (1 - Male 2-dose series) 05/05/2011   • HEPATITIS C SCREENING  08/02/2018   • TDAP/TD VACCINES (1 - Tdap) 05/05/2019   • INFLUENZA VACCINE  08/01/2020       Patient gave consent today for a telehealth video visit as following recommendations of our governor and CDC during the COVID-19 pandemic.    20 min was spent in discussion with pt and greater than 50% of that time was spent counseling.         EMR Dragon/Transcription disclaimer:   Much of this encounter note is an electronic transcription/translation of spoken language to printed text. The electronic translation of spoken language may permit erroneous, or at times, nonsensical words or phrases to be inadvertently transcribed; Although I have reviewed the note for such errors, some may still exist.      Zoom platform used for visit with good A/V quality.

## 2020-12-02 NOTE — TELEPHONE ENCOUNTER
Caller: Loren Medina    Relationship to patient: Mother    Best call back number: [phone]     Patient is needing: Patient mother called in and stated he has a fever , chills and body aches and was given an antibiotic doxycycline (Vibramycin) 100 MG capsule and he is not tolerating it . Patient has been tested for covid-19 5 times and it has came back negative all 5 times . She also ordered lab work and Loren was just inquiring about that she didn't see an order in his chart.

## 2020-12-03 LAB
BACTERIA SPEC AEROBE CULT: NORMAL
BACTERIA SPEC AEROBE CULT: NORMAL

## 2020-12-05 ENCOUNTER — TELEMEDICINE (OUTPATIENT)
Dept: FAMILY MEDICINE CLINIC | Facility: CLINIC | Age: 20
End: 2020-12-05

## 2020-12-05 VITALS — OXYGEN SATURATION: 98 % | HEART RATE: 80 BPM

## 2020-12-05 DIAGNOSIS — I00 ACUTE RHEUMATIC FEVER: Primary | ICD-10-CM

## 2020-12-05 PROCEDURE — 99214 OFFICE O/P EST MOD 30 MIN: CPT | Performed by: INTERNAL MEDICINE

## 2020-12-05 NOTE — PROGRESS NOTES
Subjective   Hua Medina is a 20 y.o. male who comes in today for No chief complaint on file.  .    History of Present Illness   You have chosen to receive care through a telephone visit. Do you consent to use a telephone visit for your medical care today? Yes    On call telemed visit due to newly dx ARF (first epidsode)  with ASO titer greater than 300, fever for over a week, arthralgias and arthritis and rash.  Was put on 1000 mg amoxil tid from [facility] ER yesterday.  Has had 4 doses thus far and is tolerating it well.  Is still having fever, 101 today.  O/w doing well.  HR and BP are stable.  Drinking fine.  Upon my review of ER visits this past week prior to making the dx-- CXR neg, Chest CT reported to be neg by mom, and no EKG done.  He does have occasional soa and chest tightness off and on for a week or more.  Spoke with on call cardiology for [facility] and Dr. Alcantara.  He is taking advil for fever currently.  He denies chorea or mental status changes  The following portions of the patient's history were reviewed and updated as appropriate: allergies, current medications, past family history, past medical history, past social history, past surgical history and problem list.    Review of Systems   Constitutional: Positive for fever.   Gastrointestinal: Negative.    Musculoskeletal: Positive for arthralgias.   Skin: Positive for rash.       Pulse 80   SpO2 98%     STEADI Fall Risk Assessment has not been completed.    PHQ-2/PHQ-9 Depression Screening 9/26/2019   Little interest or pleasure in doing things 0   Feeling down, depressed, or hopeless 0   Trouble falling or staying asleep, or sleeping too much 2   Feeling tired or having little energy 1   Poor appetite or overeating 0   Feeling bad about yourself - or that you are a failure or have let yourself or your family down 0   Trouble concentrating on things, such as reading the newspaper or watching television 0   Moving or speaking so slowly that other people  could have noticed. Or the opposite - being so fidgety or restless that you have been moving around a lot more than usual 0   Thoughts that you would be better off dead, or of hurting yourself in some way 0   Total Score 3   If you checked off any problems, how difficult have these problems made it for you to do your work, take care of things at home, or get along with other people? Somewhat difficult       Objective   Physical Exam  Neurological:      Mental Status: He is alert.   Psychiatric:         Mood and Affect: Mood normal.         Behavior: Behavior normal.         Thought Content: Thought content normal.         Judgment: Judgment normal.           Current Outpatient Medications:   •  albuterol sulfate  (90 Base) MCG/ACT inhaler, Inhale 4 puffs., Disp: , Rfl:   •  doxycycline (Vibramycin) 100 MG capsule, Take 1 capsule by mouth 2 (Two) Times a Day., Disp: 20 capsule, Rfl: 0  •  ibuprofen (ADVIL,MOTRIN) 400 MG tablet, Take 400 mg by mouth., Disp: , Rfl:   •  SUMAtriptan (IMITREX) 50 MG tablet, Once at onset of migraine may repeat once in 2 hrs prn, Disp: 20 tablet, Rfl: 0    Assessment/Plan   Diagnoses and all orders for this visit:    1. Acute rheumatic fever (Primary)  -     Ambulatory Referral to Infectious Disease    Dr. Alcantara will order EKG and Echo for Monday morning and f/u with her on Tuesday  (mom will call Monday to make sure appt scheduled)  Referral to ID for PCN G and f/u on secondary prevention.  Mom will call Monday as well  Change to ASA as anti inflammatory from advil.  Can continue tylenol prn if needed  Take asa with food and stay hydrated.  Mom is going to have him wear Apple Watch for EKG  Go to ER if acute changes.  Trying to avoid ER due to covid exposure.    Total time CC over 60 minutes with patient care, discussing case with Dr. Alcantara and researching treatment options and needed f/u               I have asked for the patient to return to clinic in 2day(s).

## 2020-12-06 DIAGNOSIS — I00 ACUTE RHEUMATIC FEVER: Primary | ICD-10-CM

## 2020-12-07 ENCOUNTER — HOSPITAL ENCOUNTER (OUTPATIENT)
Dept: CARDIOLOGY | Facility: HOSPITAL | Age: 20
Discharge: HOME | End: 2020-12-07
Admitting: INTERNAL MEDICINE

## 2020-12-07 ENCOUNTER — RESULTS ENCOUNTER (OUTPATIENT)
Dept: FAMILY MEDICINE CLINIC | Facility: CLINIC | Age: 20
End: 2020-12-07

## 2020-12-07 VITALS
HEART RATE: 67 BPM | HEIGHT: 70 IN | BODY MASS INDEX: 30.77 KG/M2 | WEIGHT: 214.95 LBS | DIASTOLIC BLOOD PRESSURE: 75 MMHG | SYSTOLIC BLOOD PRESSURE: 150 MMHG

## 2020-12-07 DIAGNOSIS — R50.9 FEVER OF UNKNOWN ORIGIN: ICD-10-CM

## 2020-12-07 DIAGNOSIS — I00 ACUTE RHEUMATIC FEVER: ICD-10-CM

## 2020-12-07 PROCEDURE — 93306 TTE W/DOPPLER COMPLETE: CPT | Performed by: INTERNAL MEDICINE

## 2020-12-07 PROCEDURE — 93306 TTE W/DOPPLER COMPLETE: CPT

## 2020-12-08 ENCOUNTER — TELEPHONE (OUTPATIENT)
Dept: FAMILY MEDICINE CLINIC | Facility: CLINIC | Age: 20
End: 2020-12-08

## 2020-12-08 ENCOUNTER — OFFICE VISIT (OUTPATIENT)
Dept: CARDIOLOGY | Facility: CLINIC | Age: 20
End: 2020-12-08

## 2020-12-08 ENCOUNTER — TELEPHONE (OUTPATIENT)
Dept: CARDIOLOGY | Facility: CLINIC | Age: 20
End: 2020-12-08

## 2020-12-08 VITALS
SYSTOLIC BLOOD PRESSURE: 136 MMHG | WEIGHT: 215.4 LBS | BODY MASS INDEX: 30.84 KG/M2 | HEART RATE: 72 BPM | HEIGHT: 70 IN | DIASTOLIC BLOOD PRESSURE: 70 MMHG

## 2020-12-08 DIAGNOSIS — I00 ACUTE RHEUMATIC FEVER: Primary | ICD-10-CM

## 2020-12-08 LAB
AORTIC ARCH: 2.3 CM
ASCENDING AORTA: 2.8 CM
BH CV ECHO MEAS - ACS: 2.3 CM
BH CV ECHO MEAS - AO MAX PG (FULL): 0.49 MMHG
BH CV ECHO MEAS - AO MAX PG: 6.3 MMHG
BH CV ECHO MEAS - AO MEAN PG (FULL): 1 MMHG
BH CV ECHO MEAS - AO MEAN PG: 4 MMHG
BH CV ECHO MEAS - AO ROOT AREA (BSA CORRECTED): 1.4
BH CV ECHO MEAS - AO ROOT AREA: 7.1 CM^2
BH CV ECHO MEAS - AO ROOT DIAM: 3 CM
BH CV ECHO MEAS - AO V2 MAX: 125 CM/SEC
BH CV ECHO MEAS - AO V2 MEAN: 89.9 CM/SEC
BH CV ECHO MEAS - AO V2 VTI: 25.7 CM
BH CV ECHO MEAS - ASC AORTA: 2.8 CM
BH CV ECHO MEAS - AVA(I,A): 3.2 CM^2
BH CV ECHO MEAS - AVA(I,D): 3.2 CM^2
BH CV ECHO MEAS - AVA(V,A): 3.3 CM^2
BH CV ECHO MEAS - AVA(V,D): 3.3 CM^2
BH CV ECHO MEAS - BSA(HAYCOCK): 2.2 M^2
BH CV ECHO MEAS - BSA: 2.2 M^2
BH CV ECHO MEAS - BZI_BMI: 30.8 KILOGRAMS/M^2
BH CV ECHO MEAS - BZI_METRIC_HEIGHT: 177.8 CM
BH CV ECHO MEAS - BZI_METRIC_WEIGHT: 97.5 KG
BH CV ECHO MEAS - EDV(MOD-SP2): 105 ML
BH CV ECHO MEAS - EDV(MOD-SP4): 103 ML
BH CV ECHO MEAS - EDV(TEICH): 123.8 ML
BH CV ECHO MEAS - EF(CUBED): 61.8 %
BH CV ECHO MEAS - EF(MOD-BP): 58.3 %
BH CV ECHO MEAS - EF(MOD-SP2): 57.1 %
BH CV ECHO MEAS - EF(MOD-SP4): 60.2 %
BH CV ECHO MEAS - EF(TEICH): 53.1 %
BH CV ECHO MEAS - ESV(MOD-SP2): 45 ML
BH CV ECHO MEAS - ESV(MOD-SP4): 41 ML
BH CV ECHO MEAS - ESV(TEICH): 58.1 ML
BH CV ECHO MEAS - FS: 27.5 %
BH CV ECHO MEAS - IVS/LVPW: 0.92
BH CV ECHO MEAS - IVSD: 1.1 CM
BH CV ECHO MEAS - LAT PEAK E' VEL: 16.6 CM/SEC
BH CV ECHO MEAS - LV DIASTOLIC VOL/BSA (35-75): 47.9 ML/M^2
BH CV ECHO MEAS - LV MASS(C)D: 227.4 GRAMS
BH CV ECHO MEAS - LV MASS(C)DI: 105.6 GRAMS/M^2
BH CV ECHO MEAS - LV MAX PG: 5.8 MMHG
BH CV ECHO MEAS - LV MEAN PG: 3 MMHG
BH CV ECHO MEAS - LV SYSTOLIC VOL/BSA (12-30): 19 ML/M^2
BH CV ECHO MEAS - LV V1 MAX: 120 CM/SEC
BH CV ECHO MEAS - LV V1 MEAN: 86.3 CM/SEC
BH CV ECHO MEAS - LV V1 VTI: 23.7 CM
BH CV ECHO MEAS - LVIDD: 5.1 CM
BH CV ECHO MEAS - LVIDS: 3.7 CM
BH CV ECHO MEAS - LVLD AP2: 8.7 CM
BH CV ECHO MEAS - LVLD AP4: 8.2 CM
BH CV ECHO MEAS - LVLS AP2: 7.6 CM
BH CV ECHO MEAS - LVLS AP4: 6.8 CM
BH CV ECHO MEAS - LVOT AREA (M): 3.5 CM^2
BH CV ECHO MEAS - LVOT AREA: 3.5 CM^2
BH CV ECHO MEAS - LVOT DIAM: 2.1 CM
BH CV ECHO MEAS - LVPWD: 1.2 CM
BH CV ECHO MEAS - MED PEAK E' VEL: 11.3 CM/SEC
BH CV ECHO MEAS - MR MAX PG: 42 MMHG
BH CV ECHO MEAS - MR MAX VEL: 324 CM/SEC
BH CV ECHO MEAS - MV A MAX VEL: 47.7 CM/SEC
BH CV ECHO MEAS - MV DEC SLOPE: 243.2 CM/SEC^2
BH CV ECHO MEAS - MV DEC TIME: 0.27 SEC
BH CV ECHO MEAS - MV E MAX VEL: 80.2 CM/SEC
BH CV ECHO MEAS - MV E/A: 1.7
BH CV ECHO MEAS - MV MAX PG: 3.6 MMHG
BH CV ECHO MEAS - MV MEAN PG: 1 MMHG
BH CV ECHO MEAS - MV P1/2T MAX VEL: 45.4 CM/SEC
BH CV ECHO MEAS - MV P1/2T: 54.7 MSEC
BH CV ECHO MEAS - MV V2 MAX: 94.7 CM/SEC
BH CV ECHO MEAS - MV V2 MEAN: 56.5 CM/SEC
BH CV ECHO MEAS - MV V2 VTI: 29.2 CM
BH CV ECHO MEAS - MVA P1/2T LCG: 4.8 CM^2
BH CV ECHO MEAS - MVA(P1/2T): 4 CM^2
BH CV ECHO MEAS - MVA(VTI): 2.8 CM^2
BH CV ECHO MEAS - PA ACC TIME: 0.14 SEC
BH CV ECHO MEAS - PA MAX PG (FULL): 4 MMHG
BH CV ECHO MEAS - PA MAX PG: 7.6 MMHG
BH CV ECHO MEAS - PA PR(ACCEL): 18.3 MMHG
BH CV ECHO MEAS - PA V2 MAX: 138 CM/SEC
BH CV ECHO MEAS - PULM A REVS DUR: 0.14 SEC
BH CV ECHO MEAS - PULM A REVS VEL: 22.8 CM/SEC
BH CV ECHO MEAS - PULM DIAS VEL: 66.5 CM/SEC
BH CV ECHO MEAS - PULM S/D: 0.58
BH CV ECHO MEAS - PULM SYS VEL: 38.3 CM/SEC
BH CV ECHO MEAS - PVA(V,A): 2.2 CM^2
BH CV ECHO MEAS - PVA(V,D): 2.2 CM^2
BH CV ECHO MEAS - QP/QS: 0.64
BH CV ECHO MEAS - RAP SYSTOLE: 3 MMHG
BH CV ECHO MEAS - RV MAX PG: 3.6 MMHG
BH CV ECHO MEAS - RV MEAN PG: 2 MMHG
BH CV ECHO MEAS - RV V1 MAX: 95.1 CM/SEC
BH CV ECHO MEAS - RV V1 MEAN: 59.2 CM/SEC
BH CV ECHO MEAS - RV V1 VTI: 16.6 CM
BH CV ECHO MEAS - RVOT AREA: 3.1 CM^2
BH CV ECHO MEAS - RVOT DIAM: 2 CM
BH CV ECHO MEAS - RVSP: 28 MMHG
BH CV ECHO MEAS - SI(AO): 84.4 ML/M^2
BH CV ECHO MEAS - SI(CUBED): 38.1 ML/M^2
BH CV ECHO MEAS - SI(LVOT): 38.1 ML/M^2
BH CV ECHO MEAS - SI(MOD-SP2): 27.9 ML/M^2
BH CV ECHO MEAS - SI(MOD-SP4): 28.8 ML/M^2
BH CV ECHO MEAS - SI(TEICH): 30.5 ML/M^2
BH CV ECHO MEAS - SUP REN AO DIAM: 2.2 CM
BH CV ECHO MEAS - SV(AO): 181.7 ML
BH CV ECHO MEAS - SV(CUBED): 82 ML
BH CV ECHO MEAS - SV(LVOT): 82.1 ML
BH CV ECHO MEAS - SV(MOD-SP2): 60 ML
BH CV ECHO MEAS - SV(MOD-SP4): 62 ML
BH CV ECHO MEAS - SV(RVOT): 52.2 ML
BH CV ECHO MEAS - SV(TEICH): 65.7 ML
BH CV ECHO MEAS - TAPSE (>1.6): 2.8 CM
BH CV ECHO MEAS - TR MAX VEL: 246 CM/SEC
BH CV ECHO MEASUREMENTS AVERAGE E/E' RATIO: 5.75
BH CV XLRA - RV BASE: 3.9 CM
BH CV XLRA - RV LENGTH: 7.2 CM
BH CV XLRA - RV MID: 3 CM
BH CV XLRA - TDI S': 13.7 CM/SEC
LEFT ATRIUM VOLUME INDEX: 30 ML/M2
MAXIMAL PREDICTED HEART RATE: 200 BPM
SINUS: 2.7 CM
STJ: 2.7 CM
STRESS TARGET HR: 170 BPM

## 2020-12-08 PROCEDURE — 99214 OFFICE O/P EST MOD 30 MIN: CPT | Performed by: INTERNAL MEDICINE

## 2020-12-08 PROCEDURE — 93000 ELECTROCARDIOGRAM COMPLETE: CPT | Performed by: INTERNAL MEDICINE

## 2020-12-08 RX ORDER — ASPIRIN 81 MG/1
81 TABLET ORAL DAILY
COMMUNITY
End: 2021-01-05

## 2020-12-08 NOTE — TELEPHONE ENCOUNTER
Caller: Loren Medina    Relationship to patient: Mother    Best call back number: [phone]     Concerns or Questions if Applicable: LOREN CALLED STATING  CHRISTINE WAS DIAGNOSED WITH RHEUMATIC FEVER 12/4/2020, SHE IS WANTING TO FOLLOW UP WITH AIDA RODRIGUEZ .     PLEASE ADVISE       Loren Medina (Mother) [phone]

## 2020-12-08 NOTE — TELEPHONE ENCOUNTER
Called pt he stated that he has a appt with cardio tomorrow 12/9/2020 and will call back to make an appt with PCP

## 2020-12-08 NOTE — PROGRESS NOTES
Date of Office Visit: 2020  Encounter Provider: Ghislaine Alcantara MD  Place of Service: [facility] CARDIOLOGY  Patient Name: Hua Medina  :2000      Patient ID:  Hua Mednia is a 20 y.o. male is here for acute rheumatic fever          History of Present Illness    He was here for acute rheumatic fever.  An echocardiogram done 2020 showed ejection 50% with normal RVSP, moderate left ventricular perjury and borderline right ventricular dilation.    He had a fever lasting about 8 days and became more fatigued as well as developing back pain bilateral knee pain and swelling bilateral ankle pain and a rash over his back and shoulders.  He presented to [facility].  he had evidence of inflammation with elevated sed rate and CRP, swelling and pain in his knees and ankles as well as back, fever lasting for 8 days of 102.  By Linton criteria, he met definition for acute rheumatic fever.  He was started on amoxicillin 3000 mg daily which now he is on 1500 mg daily.  He has had no fever since Friday.  His knees feel better but his ankles are still sore.  His back pain is better he still mildly short winded.  He is not felt his heart racing or skipping.  Is had no dizziness or syncope.    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  And involves a lot of walking, going up and down ladders.  He likes to lift weights.  He does not smoke, use alcohol or drugs and has 2 to 3 cups of coffee per day.  There is no family history of premature cardiovascular disease.      Past Medical History:   Diagnosis Date   • Asthma    • Migraine    • Seizures (CMS/HCC)          Past Surgical History:   Procedure Laterality Date   • EAR TUBES     • FINGER SURGERY         Current Outpatient Medications on File Prior to Visit   Medication Sig Dispense Refill   • albuterol sulfate   (90 Base) MCG/ACT inhaler Inhale 4 puffs As Needed.     • aspirin 81 MG EC tablet Take 81 mg by mouth Daily.     • SUMAtriptan (IMITREX) 50 MG tablet Once at onset of migraine may repeat once in 2 hrs prn 20 tablet 0   • [DISCONTINUED] doxycycline (Vibramycin) 100 MG capsule Take 1 capsule by mouth 2 (Two) Times a Day. 20 capsule 0   • [DISCONTINUED] ibuprofen (ADVIL,MOTRIN) 400 MG tablet Take 400 mg by mouth.       No current facility-administered medications on file prior to visit.        Social History     Socioeconomic History   • Marital status: Single     Spouse name: Not on file   • Number of children: Not on file   • Years of education: Not on file   • Highest education level: Not on file   Tobacco Use   • Smoking status: Never Smoker   • Smokeless tobacco: Never Used   Substance and Sexual Activity   • Alcohol use: No     Frequency: Never     Comment: Caffeine use: 2-3 cups daily   • Drug use: No   • Sexual activity: Never           Review of Systems   Constitution: Positive for malaise/fatigue.   HENT: Negative for congestion.    Eyes: Negative for vision loss in left eye and vision loss in right eye.   Respiratory: Positive for shortness of breath. Negative for cough, hemoptysis, sleep disturbances due to breathing, snoring, sputum production and wheezing.    Endocrine: Negative.    Hematologic/Lymphatic: Negative.    Skin: Negative for poor wound healing and rash.   Musculoskeletal: Negative for falls, gout, muscle cramps and myalgias.   Gastrointestinal: Negative for abdominal pain, diarrhea, dysphagia, hematemesis, melena, nausea and vomiting.   Neurological: Negative for excessive daytime sleepiness, dizziness, headaches, light-headedness, loss of balance, seizures and vertigo.   Psychiatric/Behavioral: Negative for depression and substance abuse. The patient is not nervous/anxious.        Procedures    ECG 12 Lead    Date/Time: 12/8/2020 1:16 PM  Performed by: Ghislaine Alcantara  "MD  Authorized by: Ghislaine Alcantara MD   Comparison: not compared with previous ECG   Previous ECG: no previous ECG available  Rhythm: sinus rhythm    Clinical impression: normal ECG                Objective:      Vitals:    12/08/20 1230 12/08/20 1232   BP: 140/70 136/70   BP Location: Right arm Left arm   Pulse: 72    Weight: 97.7 kg (215 lb 6.4 oz)    Height: 177.8 cm (70\")      Body mass index is 30.91 kg/m².    Vitals signs reviewed.   Constitutional:       General: Not in acute distress.     Appearance: Well-developed. Not diaphoretic.   Eyes:      General: No scleral icterus.     Conjunctiva/sclera: Conjunctivae normal.   HENT:      Head: Normocephalic and atraumatic.   Neck:      Musculoskeletal: Neck supple.      Thyroid: No thyromegaly.      Vascular: No carotid bruit or JVD.      Lymphadenopathy: No cervical adenopathy.   Pulmonary:      Effort: Pulmonary effort is normal. No respiratory distress.      Breath sounds: Normal breath sounds. No wheezing. No rhonchi. No rales.   Chest:      Chest wall: Not tender to palpatation.   Cardiovascular:      Normal rate. Regular rhythm.      Murmurs: There is no murmur.      No gallop.   Pulses:     Intact distal pulses.   Edema:     Peripheral edema present.     Ankle: 1+ edema of the right ankle.  Abdominal:      General: Bowel sounds are normal. There is no distension or abdominal bruit.      Palpations: Abdomen is soft. There is no abdominal mass.      Tenderness: There is no abdominal tenderness.   Musculoskeletal:         General: No deformity.      Extremities: No clubbing present.  Skin:     General: Skin is warm and dry. There is no cyanosis.      Coloration: Skin is not pale.      Findings: No rash.   Neurological:      Mental Status: Alert and oriented to person, place, and time.      Cranial Nerves: No cranial nerve deficit.   Psychiatric:         Judgment: Judgment normal.         Lab Review:       Assessment:      Diagnosis Plan   1. Acute " rheumatic fever         1. Acute rheumatic fever, symptomatically improved on amoxicillin and aspirin.  Patient really should be treated with benzathine penicillin G 900 mg intramuscularly once followed by benzathine penicillin G 900 mg intramuscularly every 21 to 28 days for 5 years or until the age of 21 which ever comes later, patients without carditis.  Symptomatic management of arthritis arthralgias include aspirin 50-60 mg/kg/day and 4-5 divided doses.  For him that would be about 1000 to 1500 mg 4 times daily of aspirin.  His echo shows no sequelae rheumatic heart complications.  Would recheck this in about 6 months.  I advised him to call me should he develop recurrent short windedness, tachycardia, dizziness, decrease in energy level, chest pain, fevers.      Plan:       Overall I think he is improving but he probably needs to be given a dose of penicillin G since treatment so far has been amoxicillin.  I did speak with Dr. Torre.

## 2020-12-10 ENCOUNTER — OFFICE VISIT (OUTPATIENT)
Dept: FAMILY MEDICINE CLINIC | Facility: CLINIC | Age: 20
End: 2020-12-10

## 2020-12-10 VITALS
BODY MASS INDEX: 31.64 KG/M2 | WEIGHT: 221 LBS | DIASTOLIC BLOOD PRESSURE: 77 MMHG | HEART RATE: 71 BPM | OXYGEN SATURATION: 99 % | SYSTOLIC BLOOD PRESSURE: 136 MMHG | HEIGHT: 70 IN | TEMPERATURE: 97.8 F

## 2020-12-10 DIAGNOSIS — I00 ACUTE RHEUMATIC FEVER: Primary | ICD-10-CM

## 2020-12-10 PROCEDURE — 99213 OFFICE O/P EST LOW 20 MIN: CPT | Performed by: NURSE PRACTITIONER

## 2020-12-10 RX ORDER — AMOXICILLIN 500 MG/1
TABLET, FILM COATED ORAL
COMMUNITY
Start: 2020-12-04 | End: 2021-02-08

## 2020-12-10 NOTE — PROGRESS NOTES
Subjective   Hua Medina is a 20 y.o. male.     Chief Complaint   Patient presents with   • cardiologist follow-up     how much of the amoxcillian should he be taking for the rheumatic fever.       HPI patient is here to follow-up acute rheumatic fever diagnosis and recent cardiology visit.  He is feeling well.  He has been afebrile.  He was having a little bit of shortness of breath but that is now resolved.  His arthritis in his ankles and a little bit in his knees is now resolved.  He is taking less aspirin and will continue taper down over the next 2 weeks.    He is taking 1500 mg/day of amoxicillin as directed by cardiology and will complete this as directed.  He has questions about whether or not he will have to have penicillin injections.    He has an infectious disease evaluation in February.    He will be returning to work on Monday.  He still feels a little bit tired but overall feeling much improved.    He has a little bit of trouble sleeping which is normal for him.  He reports very vivid dreams and sometimes has trouble knowing whether or not he woke up from the dream.  He he thinks he has attention deficit but not ever been diagnosed.    He does not smoke, denies any alcohol or recreational drugs.    Social History     Tobacco Use   • Smoking status: Never Smoker   • Smokeless tobacco: Never Used   Substance Use Topics   • Alcohol use: No     Frequency: Never     Comment: Caffeine use: 2-3 cups daily   • Drug use: No       The following portions of the patient's history were reviewed and updated as appropriate: allergies, current medications, past family history, past medical history, past social history, past surgical history and problem list.    Review of Systems   Constitutional: Negative for chills and fever.   Respiratory: Negative for cough and shortness of breath.    Cardiovascular: Negative for chest pain, palpitations and leg swelling.   Gastrointestinal: Negative for abdominal pain, blood  "in stool, diarrhea, nausea and vomiting.   Genitourinary: Negative for dysuria, hematuria and urgency.   Skin: Negative for rash (Resolved).   Neurological: Negative for dizziness, syncope, weakness and headaches.   Psychiatric/Behavioral: Negative for dysphoric mood. The patient is not nervous/anxious.        Objective   Blood pressure 136/77, pulse 71, temperature 97.8 °F (36.6 °C), temperature source Temporal, height 177.8 cm (70\"), weight 100 kg (221 lb), SpO2 99 %.  Body mass index is 31.71 kg/m².    Physical Exam  Vitals signs and nursing note reviewed.   Constitutional:       General: He is not in acute distress.     Appearance: He is well-developed. He is not ill-appearing or diaphoretic.   HENT:      Head: Normocephalic and atraumatic.      Comments: B/l shiners beneath eyes     Right Ear: Tympanic membrane, ear canal and external ear normal.      Left Ear: Tympanic membrane, ear canal and external ear normal.      Mouth/Throat:      Mouth: Mucous membranes are moist.      Pharynx: Oropharynx is clear.   Eyes:      General:         Right eye: No discharge.         Left eye: No discharge.      Conjunctiva/sclera: Conjunctivae normal.   Neck:      Musculoskeletal: Neck supple.   Cardiovascular:      Rate and Rhythm: Normal rate and regular rhythm.      Heart sounds: Normal heart sounds. No murmur.   Pulmonary:      Effort: Pulmonary effort is normal.      Breath sounds: Normal breath sounds.   Abdominal:      General: Bowel sounds are normal.      Palpations: Abdomen is soft.      Tenderness: There is no abdominal tenderness.   Musculoskeletal:         General: No deformity.      Comments: Gait smooth and steady   Lymphadenopathy:      Cervical: No cervical adenopathy.   Skin:     General: Skin is warm and dry.      Findings: No rash.   Neurological:      General: No focal deficit present.      Mental Status: He is alert and oriented to person, place, and time.   Psychiatric:         Mood and Affect: Mood " normal.         Behavior: Behavior normal.         Assessment   Problem List Items Addressed This Visit        Other    Acute rheumatic fever - Primary    Relevant Medications    amoxicillin (AMOXIL) 500 MG tablet           Procedures           Impression and Plan:  Pt is currently taking and tolerating 1500 mg of amoxicillin per day.  He is not having any trouble with the 3 times daily dosing.  He is weaning down on his aspirin as his joint pain has improved.  He has been evaluated by cardiology and will follow up again in 6 months.  Cardiology note reviewed and discussed with patient recommendations for 1,200,000 units of pen G IM Q 21-28 days for secondary prophylaxis.  Patient feels that he can be compliant with this if he has to.  I will work on getting this set up in [facility] if possible.  He does not have an infectious disease evaluation until February.  If ID makes any different recommendations will follow their recommendations.    We discussed that he is at increased risk for recurrence especially his first year.  We discussed that signs and symptoms might not be the same as what he had previously.  So he should be aware of different symptomatology as we discussed.  We also discussed that he has not had Covid so he should make sure he is following all CDC recommendations.  We also discussed being careful to avoid flu as well.    I told him that I would let him know by next week what plans we are able to arrange for his pen G injections.      Health Maintenance Due   Topic Date Due   • ANNUAL PHYSICAL  05/05/2003   • Pneumococcal Vaccine 0-64 (1 of 1 - PPSV23) 05/05/2006   • HPV VACCINES (1 - Male 2-dose series) 05/05/2011   • HEPATITIS C SCREENING  08/02/2018   • TDAP/TD VACCINES (1 - Tdap) 05/05/2019   • INFLUENZA VACCINE  08/01/2020              EMR Dragon/Transcription disclaimer:   Much of this encounter note is an electronic transcription/translation of spoken language to printed text. The electronic  translation of spoken language may permit erroneous, or at times, nonsensical words or phrases to be inadvertently transcribed; Although I have reviewed the note for such errors, some may still exist.

## 2020-12-14 DIAGNOSIS — I00 ACUTE RHEUMATIC FEVER: Primary | ICD-10-CM

## 2020-12-21 ENCOUNTER — TELEPHONE (OUTPATIENT)
Dept: FAMILY MEDICINE CLINIC | Facility: CLINIC | Age: 20
End: 2020-12-21

## 2020-12-29 ENCOUNTER — TELEPHONE (OUTPATIENT)
Dept: FAMILY MEDICINE CLINIC | Facility: CLINIC | Age: 20
End: 2020-12-29

## 2020-12-29 NOTE — TELEPHONE ENCOUNTER
PT CALLED STATING THERE WAS AN ERROR ON HIS [place] PAPERWORK HE NEEDS TO HAVE CORRECTED AND THEN FOR THE CORRECTED PAPERWORK TO BE FAXED BACK TO HIS WORK.    HE STATES THAT ON PAGE 3, PART B, THERE IS A PLACE FOR AN ESTIMATED RETURN TO WORK. HE STATES SHE NEEDS TO PLACE THE November 28 THROUGH December 11, AND THEY WILL APPROVE HIS FMLA.     HE CAN BE REACHED [phone]

## 2021-01-05 ENCOUNTER — HOSPITAL ENCOUNTER (OUTPATIENT)
Dept: INFUSION THERAPY | Facility: HOSPITAL | Age: 21
Discharge: HOME | End: 2021-01-05
Admitting: NURSE PRACTITIONER

## 2021-01-05 VITALS
RESPIRATION RATE: 18 BRPM | HEART RATE: 71 BPM | TEMPERATURE: 97.7 F | DIASTOLIC BLOOD PRESSURE: 74 MMHG | SYSTOLIC BLOOD PRESSURE: 147 MMHG | OXYGEN SATURATION: 97 %

## 2021-01-05 DIAGNOSIS — I00 ACUTE RHEUMATIC FEVER: Primary | ICD-10-CM

## 2021-01-05 PROCEDURE — 96372 THER/PROPH/DIAG INJ SC/IM: CPT

## 2021-01-05 PROCEDURE — 25010000002 PENICILLIN G BENZATHINE PER 1200000 UNITS: Performed by: NURSE PRACTITIONER

## 2021-01-05 RX ADMIN — PENICILLIN G BENZATHINE 1.2 MILLION UNITS: 1200000 INJECTION, SUSPENSION INTRAMUSCULAR at 15:08

## 2021-01-05 NOTE — PROGRESS NOTES
Patient tolerated injection without difficulty. No s/s of reaction noted.  Patient monitored for 30 minutes post injection. Patient discharged at 1530 per ambulation.

## 2021-01-05 NOTE — PATIENT INSTRUCTIONS
Penicillin G Benzathine Injection  What is this medicine?  PENICILLIN G BENZATHINE (pen i SILL in ADELAIDE goss) is a penicillin antibiotic. It treats some infections caused by bacteria. It will not work for colds, the flu, or other viruses.  This medicine may be used for other purposes; ask your health care provider or pharmacist if you have questions.  COMMON BRAND NAME(S): Bicillin L-A, Permapen  What should I tell my health care provider before I take this medicine?  They need to know if you have any of these conditions:  · asthma  · kidney disease  · an unusual or allergic reaction to penicillin G benzathine, any penicillin or cephalosporin antibiotic, other medicines, foods, dyes, or preservatives  · pregnant or trying to get pregnant  · breast-feeding  How should I use this medicine?  This drug is injected into a muscle. It is usually given by a health care provider in a hospital or clinic setting.  Talk to your health care provider about the use of this drug in children. While it may be prescribed for selected conditions, precautions do apply.  Overdosage: If you think you have taken too much of this medicine contact a poison control center or emergency room at once.  NOTE: This medicine is only for you. Do not share this medicine with others.  What if I miss a dose?  This does not apply. This drug is not for regular use.  What may interact with this medicine?  · aspirin  · birth control pills  · diuretics  · ethacrynic acid  · indomethacin  · methotrexate  · phenylbutazone  · probenecid  · some antibiotics like chloramphenicol, erythromycin, sulfamethoxazole, tetracycline  · typhoid vaccine  This list may not describe all possible interactions. Give your health care provider a list of all the medicines, herbs, non-prescription drugs, or dietary supplements you use. Also tell them if you smoke, drink alcohol, or use illegal drugs. Some items may interact with your medicine.  What should I watch for while  using this medicine?  Tell your doctor or healthcare professional if your symptoms do not start to get better or if they get worse.  Do not treat diarrhea with over the counter products. Contact your doctor if you have diarrhea that lasts more than 2 days or if it is severe and watery.  This medicine can interfere with some urine glucose tests. If you use such tests, talk with your health care professional.  What side effects may I notice from receiving this medicine?  Side effects that you should report to your doctor or health care professional as soon as possible:  · allergic reactions like skin rash, itching or hives, swelling of the face, lips, or tongue  · breathing problems  · dark urine  · fever with headache, flushing  · muscle cramps  · pain or difficulty passing urine  · red spots on the skin  · redness, blistering, peeling or loosening of the skin, including inside the mouth  · seizures  · unusual bleeding or bruising  · unusually weak or tired  Side effects that usually do not require medical attention (report to your doctor or health care professional if they continue or are bothersome):  · diarrhea  · dizziness  · headache  · pain where injected  · stomach upset  This list may not describe all possible side effects. Call your doctor for medical advice about side effects. You may report side effects to FDA at 3-630-FDA-6076.  Where should I keep my medicine?  This drug is given in a hospital or clinic. It will not be stored at home.  NOTE: This sheet is a summary. It may not cover all possible information. If you have questions about this medicine, talk to your doctor, pharmacist, or health care provider.  © 2020 [employer]/Gold Standard (2020-07-21 11:45:59)

## 2021-02-02 ENCOUNTER — HOSPITAL ENCOUNTER (OUTPATIENT)
Dept: INFUSION THERAPY | Facility: HOSPITAL | Age: 21
Discharge: HOME | End: 2021-02-02
Admitting: NURSE PRACTITIONER

## 2021-02-02 VITALS
TEMPERATURE: 97.3 F | RESPIRATION RATE: 20 BRPM | SYSTOLIC BLOOD PRESSURE: 129 MMHG | DIASTOLIC BLOOD PRESSURE: 60 MMHG | OXYGEN SATURATION: 98 % | HEART RATE: 72 BPM

## 2021-02-02 DIAGNOSIS — I00 ACUTE RHEUMATIC FEVER: Primary | ICD-10-CM

## 2021-02-02 PROCEDURE — 25010000002 PENICILLIN G BENZATHINE PER 1200000 UNITS: Performed by: NURSE PRACTITIONER

## 2021-02-02 PROCEDURE — 96372 THER/PROPH/DIAG INJ SC/IM: CPT

## 2021-02-02 RX ADMIN — PENICILLIN G BENZATHINE 1.2 MILLION UNITS: 1200000 INJECTION, SUSPENSION INTRAMUSCULAR at 14:56

## 2021-02-02 NOTE — PROGRESS NOTES
Tolerated injection well.  Refused need of AVS.  Discharged ambulatory after appointment completed.

## 2021-02-08 ENCOUNTER — OFFICE VISIT (OUTPATIENT)
Dept: INFECTIOUS DISEASES | Facility: CLINIC | Age: 21
End: 2021-02-08

## 2021-02-08 ENCOUNTER — LAB (OUTPATIENT)
Dept: LAB | Facility: HOSPITAL | Age: 21
End: 2021-02-08

## 2021-02-08 VITALS
SYSTOLIC BLOOD PRESSURE: 124 MMHG | TEMPERATURE: 97.3 F | WEIGHT: 218 LBS | HEIGHT: 70 IN | BODY MASS INDEX: 31.21 KG/M2 | HEART RATE: 73 BPM | DIASTOLIC BLOOD PRESSURE: 77 MMHG

## 2021-02-08 DIAGNOSIS — I00 RHEUMATIC FEVER: Primary | ICD-10-CM

## 2021-02-08 DIAGNOSIS — R79.89 ELEVATED LFTS: ICD-10-CM

## 2021-02-08 DIAGNOSIS — D72.819 LEUKOPENIA, UNSPECIFIED TYPE: ICD-10-CM

## 2021-02-08 LAB
ALBUMIN SERPL-MCNC: 4.2 G/DL (ref 3.5–5.2)
ALBUMIN/GLOB SERPL: 1.4 G/DL
ALP SERPL-CCNC: 67 U/L (ref 39–117)
ALT SERPL W P-5'-P-CCNC: 38 U/L (ref 1–41)
ANION GAP SERPL CALCULATED.3IONS-SCNC: 7.3 MMOL/L (ref 5–15)
AST SERPL-CCNC: 49 U/L (ref 1–40)
BASOPHILS # BLD AUTO: 0.02 10*3/MM3 (ref 0–0.2)
BASOPHILS NFR BLD AUTO: 0.4 % (ref 0–1.5)
BILIRUB SERPL-MCNC: 0.3 MG/DL (ref 0–1.2)
BUN SERPL-MCNC: 22 MG/DL (ref 6–20)
BUN/CREAT SERPL: 19.8 (ref 7–25)
CALCIUM SPEC-SCNC: 9.4 MG/DL (ref 8.6–10.5)
CHLORIDE SERPL-SCNC: 102 MMOL/L (ref 98–107)
CO2 SERPL-SCNC: 29.7 MMOL/L (ref 22–29)
CREAT SERPL-MCNC: 1.11 MG/DL (ref 0.76–1.27)
CRP SERPL-MCNC: <0.3 MG/DL (ref 0–0.5)
DEPRECATED RDW RBC AUTO: 37.5 FL (ref 37–54)
EOSINOPHIL # BLD AUTO: 0.13 10*3/MM3 (ref 0–0.4)
EOSINOPHIL NFR BLD AUTO: 2.4 % (ref 0.3–6.2)
ERYTHROCYTE [DISTWIDTH] IN BLOOD BY AUTOMATED COUNT: 11.7 % (ref 12.3–15.4)
ERYTHROCYTE [SEDIMENTATION RATE] IN BLOOD: 8 MM/HR (ref 0–15)
GFR SERPL CREATININE-BSD FRML MDRD: 84 ML/MIN/1.73
GLOBULIN UR ELPH-MCNC: 2.9 GM/DL
GLUCOSE SERPL-MCNC: 92 MG/DL (ref 65–99)
HCT VFR BLD AUTO: 39.2 % (ref 37.5–51)
HGB BLD-MCNC: 13.7 G/DL (ref 13–17.7)
IMM GRANULOCYTES # BLD AUTO: 0.01 10*3/MM3 (ref 0–0.05)
IMM GRANULOCYTES NFR BLD AUTO: 0.2 % (ref 0–0.5)
LYMPHOCYTES # BLD AUTO: 1.63 10*3/MM3 (ref 0.7–3.1)
LYMPHOCYTES NFR BLD AUTO: 29.5 % (ref 19.6–45.3)
MCH RBC QN AUTO: 31.4 PG (ref 26.6–33)
MCHC RBC AUTO-ENTMCNC: 34.9 G/DL (ref 31.5–35.7)
MCV RBC AUTO: 89.7 FL (ref 79–97)
MONOCYTES # BLD AUTO: 0.65 10*3/MM3 (ref 0.1–0.9)
MONOCYTES NFR BLD AUTO: 11.8 % (ref 5–12)
NEUTROPHILS NFR BLD AUTO: 3.09 10*3/MM3 (ref 1.7–7)
NEUTROPHILS NFR BLD AUTO: 55.7 % (ref 42.7–76)
NRBC BLD AUTO-RTO: 0 /100 WBC (ref 0–0.2)
PLATELET # BLD AUTO: 204 10*3/MM3 (ref 140–450)
PMV BLD AUTO: 9.8 FL (ref 6–12)
POTASSIUM SERPL-SCNC: 4.2 MMOL/L (ref 3.5–5.2)
PROT SERPL-MCNC: 7.1 G/DL (ref 6–8.5)
RBC # BLD AUTO: 4.37 10*6/MM3 (ref 4.14–5.8)
SODIUM SERPL-SCNC: 139 MMOL/L (ref 136–145)
WBC # BLD AUTO: 5.53 10*3/MM3 (ref 3.4–10.8)

## 2021-02-08 PROCEDURE — 86140 C-REACTIVE PROTEIN: CPT | Performed by: INTERNAL MEDICINE

## 2021-02-08 PROCEDURE — 36415 COLL VENOUS BLD VENIPUNCTURE: CPT | Performed by: INTERNAL MEDICINE

## 2021-02-08 PROCEDURE — 85025 COMPLETE CBC W/AUTO DIFF WBC: CPT | Performed by: INTERNAL MEDICINE

## 2021-02-08 PROCEDURE — 85652 RBC SED RATE AUTOMATED: CPT | Performed by: INTERNAL MEDICINE

## 2021-02-08 PROCEDURE — 99204 OFFICE O/P NEW MOD 45 MIN: CPT | Performed by: INTERNAL MEDICINE

## 2021-02-08 PROCEDURE — 80053 COMPREHEN METABOLIC PANEL: CPT | Performed by: INTERNAL MEDICINE

## 2021-02-08 NOTE — PROGRESS NOTES
Referring Provider: Brooklyn Torre MD  [address]  [address]  [place] [address]    Reason for Consultation: rheumatic fever    History of present illness:  Hua Medina is a 20 y.o. who I am asked to evaluate and give opinion for rheumatic fever. History is obtained from the patient and review of the old medical records which I summarize/synthesize as follows: In July 2020 he developed sore throat and cervical adenopathy. He was treated with amoxicillin and symptoms improved but didn't entirely resolve so was changed to Augmentin and symptoms finally fully resolved.     Then in November, he developed fatigue, fever, chills, body aches, and headache but no sore throat. He had a confluent rash mostly on the back that lasted about one day. He was concerned for COVID-19 as his girlfriend was positive but he tested negative multiple times. He had labs done that showed low WBC, low platelets, elevated LFTs, negative procal, negative blood cultures, negative HIV, and negative Monospot with negative SARS CoV- Ab in addition to a negative PCR. Two days later he was seen in the Er and tested negative for Strep by throat culture and had a negative CT chest. He was RXed doxycycline but had GI upset. Then on 12/4/20 he was seen again in the ER and diagnosed with rheumatic fever based on his constellation of symptoms and an ASO of 336. He was prescribed amoxicillin 1 g PO q8h x 14 days. He says his symptoms resolved while on the antibiotics. He was referred to cardiology who felt his syndrome supported the diagnosis of rheumatic fever. He was NOT found to have either carditis or valvular involvement. He was started on monthly PCN G 1.2 million unit IM injections in January 2021 and has received two thus far. He feels back to his basline.     He is here today for further recommendations.     He says he was sick often as a child. I looked back at his prior labs and even in 2018 his WBC was only 2k. He says he's  "never seen a hematologist or immunologist. No family members with recurrent infections.     Past Medical History:   Diagnosis Date   • Asthma    • Migraine    • Seizures (CMS/HCC)        Past Surgical History:   Procedure Laterality Date   • EAR TUBES     • FINGER SURGERY         Social History:  Works as a maintenance  Also in College  Lives w/ his parents    Family History:  No 1st degree relatives w/ odd or persistent infections    Antibiotic allergies and intolerances:  None    Medications:    Current Outpatient Medications:   •  albuterol sulfate  (90 Base) MCG/ACT inhaler, Inhale 4 puffs As Needed., Disp: , Rfl:   •  amoxicillin (AMOXIL) 500 MG tablet, TK 2 TS PO TID FOR 14 DAYS, Disp: , Rfl:   •  SUMAtriptan (IMITREX) 50 MG tablet, Once at onset of migraine may repeat once in 2 hrs prn, Disp: 20 tablet, Rfl: 0    Review of Systems  All systems were reviewed and are negative unless otherwise stated above in the HPI    Objective   Vital Signs   /77   Pulse 73   Temp 97.3 °F (36.3 °C)   Ht 177.8 cm (70\")   Wt 98.9 kg (218 lb)   BMI 31.28 kg/m²     Physical Exam:   General: awake, alert, NAD, very nice   Head: atraumatic  Eyes: no scleral icterus  ENT: wearing mask  Neck: Supple  Cardiovascular: NR, RR, no murmur  Respiratory: Lungs are clear to auscultation bilaterally, no rales or wheezing; normal work of breathing on ambient air  GI: Abdomen is soft, non-distended  :  no Mckeon catheter  Musculoskeletal: no joint effusions, normal musculature  Skin: No rashes, lesions, or embolic phenomenon  Neurological: Alert and oriented x 3, motor strength 5/5 in all four extremities  Psychiatric: Normal mood and affect     Labs:     Lab Results   Component Value Date    WBC 4.20 (L) 12/04/2020    HGB 13.8 12/04/2020    HCT 39.1 (L) 12/04/2020    MCV 86.1 12/04/2020     12/04/2020       Lab Results   Component Value Date    GLUCOSE 104 (H) 11/28/2020    BUN 13 11/28/2020    CREATININE 1.05 " 11/28/2020    EGFRIFNONA 90 11/28/2020    BCR 12.4 11/28/2020    CO2 30.8 (H) 11/28/2020    CALCIUM 8.8 11/28/2020    ALBUMIN 4.00 11/28/2020    AST 45 (H) 11/28/2020    ALT 45 (H) 11/28/2020     No results found for: SEDRATE  No results found for: CRP      Tick panel negative  Mono test negative  HIV Ab negative  SARS-CoV-2 Ab negative    Microbiology:  Multiple negative COVID PCRs test  11/28/20 BCx: negative    Radiology (personally reviewed report):  TTE EF 50%; no valvular changes    CT chest negative for acute process; old granulomatous disease    Assessment/Plan   1. Rheumatic fever  2. Elevated LFTs  3. Leukopenia    I think his constellation of signs and symptoms meets Linton criteria and therefore the diagnosis of rheumatic fever is probably correct. He had arthritis, rash, and fever. He never had a positive Strep culture but does have an elevated ASO. Thankfully it does not appear that he had carditis, valvular damage, or kidney damage.  I think the benefits of treating with penicillin 1.2 million units q28 days outweigh the risks. I recommend that he do this for 5 years through 12/2025. He expressed understanding. He has already been receiving these injections through the [facility] through orders of his PCP, and I would recommend that she continue these injections.     I will have him come back in 1 year for follow-up. Cardiology is planning a TTE again in 6 months.     I am going to check a CBC, CMP, ESR, and CRP today. I'll call him with the results when available.

## 2021-03-01 ENCOUNTER — HOSPITAL ENCOUNTER (OUTPATIENT)
Dept: INFUSION THERAPY | Facility: HOSPITAL | Age: 21
Discharge: HOME | End: 2021-03-01
Admitting: NURSE PRACTITIONER

## 2021-03-01 VITALS
SYSTOLIC BLOOD PRESSURE: 126 MMHG | DIASTOLIC BLOOD PRESSURE: 67 MMHG | OXYGEN SATURATION: 98 % | HEART RATE: 85 BPM | RESPIRATION RATE: 16 BRPM | TEMPERATURE: 97.1 F

## 2021-03-01 DIAGNOSIS — I00 ACUTE RHEUMATIC FEVER: Primary | ICD-10-CM

## 2021-03-01 PROCEDURE — 25010000002 PENICILLIN G BENZATHINE PER 1200000 UNITS: Performed by: NURSE PRACTITIONER

## 2021-03-01 PROCEDURE — 96372 THER/PROPH/DIAG INJ SC/IM: CPT

## 2021-03-01 RX ADMIN — PENICILLIN G BENZATHINE 1.2 MILLION UNITS: 1200000 INJECTION, SUSPENSION INTRAMUSCULAR at 14:29

## 2021-03-17 ENCOUNTER — TELEPHONE (OUTPATIENT)
Dept: FAMILY MEDICINE CLINIC | Facility: CLINIC | Age: 21
End: 2021-03-17

## 2021-03-17 NOTE — TELEPHONE ENCOUNTER
PT IS CALLING IN STATING THAT HE HAD A PENICILLIN SHOT ON MARCH 1 AND IS EXPERIENCING SOME SIDE AFFECTS FROM IT.  PT IS HAVING ITCHING ON HIS FEET AND HANDS WITH HIVES ON HIS ARMS AND LEGS. HE WOULD TAKE OVER THE COUNTER MEDS AND IT WOULD HELP WITH THE HIVES BUT THEN THEY WOULD COME RIGHT BACK.  PT NOW HAS A SWOLLEN LEFT LYMPH NODE.  HE WANTS TO BE CALLED BACK REGARDING THIS.          PT CALL BACK  [phone]  [address]  [phone]

## 2021-03-18 ENCOUNTER — OFFICE VISIT (OUTPATIENT)
Dept: FAMILY MEDICINE CLINIC | Facility: CLINIC | Age: 21
End: 2021-03-18

## 2021-03-18 VITALS
DIASTOLIC BLOOD PRESSURE: 81 MMHG | WEIGHT: 225.8 LBS | OXYGEN SATURATION: 96 % | TEMPERATURE: 97.3 F | HEART RATE: 71 BPM | HEIGHT: 70 IN | BODY MASS INDEX: 32.33 KG/M2 | SYSTOLIC BLOOD PRESSURE: 146 MMHG

## 2021-03-18 DIAGNOSIS — J45.20 MILD INTERMITTENT ASTHMA WITHOUT COMPLICATION: ICD-10-CM

## 2021-03-18 DIAGNOSIS — J02.9 PHARYNGITIS, UNSPECIFIED ETIOLOGY: Primary | ICD-10-CM

## 2021-03-18 DIAGNOSIS — I00 ACUTE RHEUMATIC FEVER: ICD-10-CM

## 2021-03-18 LAB
EXPIRATION DATE: ABNORMAL
INTERNAL CONTROL: ABNORMAL
Lab: ABNORMAL
S PYO AG THROAT QL: POSITIVE

## 2021-03-18 PROCEDURE — 99214 OFFICE O/P EST MOD 30 MIN: CPT | Performed by: NURSE PRACTITIONER

## 2021-03-18 PROCEDURE — 87880 STREP A ASSAY W/OPTIC: CPT | Performed by: NURSE PRACTITIONER

## 2021-03-18 RX ORDER — AZITHROMYCIN 250 MG/1
TABLET, FILM COATED ORAL
Qty: 6 TABLET | Refills: 0 | Status: SHIPPED | OUTPATIENT
Start: 2021-03-18 | End: 2021-04-05

## 2021-03-18 NOTE — PROGRESS NOTES
"Chief Complaint  Urticaria (hives after 3 prednisone injection 3/1/38141) and Sore Throat (glands swollen since 3rd injection of prednisone )    Subjective          Hua Medina presents to [facility]  History of Present Illness   Pt is here with report that 1 week after his third penicillin injection for rheumatic heart disease he developed hives that were mostly on his hands legs and arms and back.  He was seen by a dermatologist with him his mother works who confirmed that they were hives.  They lasted about 3 days and resolved.    3 to 4 days ago he developed left LAD and a sore throat that is pretty severe and is difficult to swallow.  He has been taking some ibuprofen which does help but only for a short time.  He has a lot of drainage in his throat which is worse in the morning seems to improve over the course the day.  He otherwise does not feel sick.  His girlfriend has not been ill and he denies any Covid exposures.      Objective   Vital Signs:   /81   Pulse 71   Temp 97.3 °F (36.3 °C) (Temporal)   Ht 177.8 cm (70\")   Wt 102 kg (225 lb 12.8 oz)   SpO2 96%   BMI 32.40 kg/m²     Physical Exam  Vitals and nursing note reviewed.   Constitutional:       General: He is not in acute distress.     Appearance: He is well-developed. He is not ill-appearing or diaphoretic.   HENT:      Head: Normocephalic and atraumatic.      Right Ear: Ear canal and external ear normal. Tympanic membrane is scarred.      Left Ear: Ear canal and external ear normal. A middle ear effusion is present. Tympanic membrane is scarred and erythematous (Very mildly erythematous).      Mouth/Throat:      Mouth: Mucous membranes are moist.      Pharynx: Oropharyngeal exudate (Left tonsil) and posterior oropharyngeal erythema present.   Eyes:      General:         Right eye: No discharge.         Left eye: No discharge.      Conjunctiva/sclera: Conjunctivae normal.   Cardiovascular:      Rate and " Rhythm: Normal rate and regular rhythm.   Pulmonary:      Effort: Pulmonary effort is normal.      Breath sounds: Normal breath sounds.   Abdominal:      General: Bowel sounds are normal.      Palpations: Abdomen is soft.      Tenderness: There is no abdominal tenderness.   Musculoskeletal:         General: No deformity.      Cervical back: Neck supple.      Comments: Gait smooth and steady   Lymphadenopathy:      Cervical: Cervical adenopathy (Bilateral anterior cervical but left more than right) present.   Skin:     General: Skin is warm and dry.      Findings: No lesion or rash.   Neurological:      General: No focal deficit present.      Mental Status: He is alert and oriented to person, place, and time.   Psychiatric:         Mood and Affect: Mood normal.         Behavior: Behavior normal.        Result Review :                 Assessment and Plan    Diagnoses and all orders for this visit:    1. Pharyngitis, unspecified etiology (Primary)  -     azithromycin (Zithromax Z-Carlton) 250 MG tablet; Take 2 tablets the first day, then 1 tablet daily for 4 days.  Dispense: 6 tablet; Refill: 0  -     POCT rapid strep A  -     Cancel: Beta Strep Culture, Throat - Swab, Throat       Rapid strep surprisingly was positive.  I am not sure if this is due to being a carrier but with his history of rheumatic heart disease I do not think I can avoid treating.  I suspect he had a recent penicillin related rash so want to avoid penicillins and for now cephalosporins and will give him azithromycin.    He has not tested positive for strep in past-not sure if he is a carrier.  I think he needs to be treated for now given his history.     As far as ongoing treatment am not sure what to do as far as his next infusion goes so I will email infectious disease for recommendations.    He does have a significant history of allergies and previous ear issues I think it would be worth it for him to see an ENT possibly an allergist for testing.   He is going to ask his mom which allergist he saw previously and will let me know if he needs a referral.      Also need to look and see if he has had any immunologic testing as this might be beneficial.  Wonder if he could have CVID.  If this has not been done I will order that.        Follow Up   Return if symptoms worsen or fail to improve.  Patient was given instructions and counseling regarding his condition or for health maintenance advice. Please see specific information pulled into the AVS if appropriate.

## 2021-03-25 DIAGNOSIS — R21 RASH AND NONSPECIFIC SKIN ERUPTION: Primary | ICD-10-CM

## 2021-04-05 ENCOUNTER — TRANSCRIBE ORDERS (OUTPATIENT)
Dept: CARDIOLOGY | Facility: HOSPITAL | Age: 21
End: 2021-04-05

## 2021-04-05 ENCOUNTER — HOSPITAL ENCOUNTER (OUTPATIENT)
Dept: INFUSION THERAPY | Facility: HOSPITAL | Age: 21
Discharge: HOME | End: 2021-04-05

## 2021-04-05 ENCOUNTER — HOSPITAL ENCOUNTER (OUTPATIENT)
Dept: CARDIOLOGY | Facility: HOSPITAL | Age: 21
Discharge: HOME | End: 2021-04-05

## 2021-04-05 ENCOUNTER — OFFICE VISIT (OUTPATIENT)
Dept: INFECTIOUS DISEASES | Facility: CLINIC | Age: 21
End: 2021-04-05

## 2021-04-05 VITALS
WEIGHT: 219.6 LBS | DIASTOLIC BLOOD PRESSURE: 78 MMHG | HEART RATE: 73 BPM | SYSTOLIC BLOOD PRESSURE: 128 MMHG | TEMPERATURE: 97.3 F | HEIGHT: 70 IN | BODY MASS INDEX: 31.44 KG/M2

## 2021-04-05 DIAGNOSIS — I00 RHEUMATIC FEVER: Primary | ICD-10-CM

## 2021-04-05 DIAGNOSIS — Z79.2 LONG TERM (CURRENT) USE OF ANTIBIOTICS: ICD-10-CM

## 2021-04-05 DIAGNOSIS — Z88.0 PENICILLIN ALLERGY: ICD-10-CM

## 2021-04-05 DIAGNOSIS — Z01.811 PRE-OP CHEST EXAM: Primary | ICD-10-CM

## 2021-04-05 LAB — QT INTERVAL: 387 MS

## 2021-04-05 PROCEDURE — 93005 ELECTROCARDIOGRAM TRACING: CPT

## 2021-04-05 PROCEDURE — 93010 ELECTROCARDIOGRAM REPORT: CPT | Performed by: INTERNAL MEDICINE

## 2021-04-05 PROCEDURE — 99213 OFFICE O/P EST LOW 20 MIN: CPT | Performed by: INTERNAL MEDICINE

## 2021-04-05 RX ORDER — AZITHROMYCIN 250 MG/1
250 TABLET, FILM COATED ORAL DAILY
Qty: 30 TABLET | Refills: 11 | Status: SHIPPED | OUTPATIENT
Start: 2021-04-05 | End: 2021-05-05

## 2021-04-05 NOTE — PROGRESS NOTES
"CC: f/u rheumatic fever    History of present illness:  Hua Medina is a 20 y.o. here today for follow-up rheumatic fever. He's had two significant recent events.     The first is that he developed sore throat and had a rapid Strep test which was positive. He had pain with swallowing and tonsillar exudates. These are now resolved after a course of azithromycin.    The second is that he developed hives about one week after his March 2021 penicillin intramuscular injection. He was referred to an allergist who confirmed the allergy. I spoke to him by phone following that appt and we agreed an alternative antibiotic is needed.       Past Medical History:   Diagnosis Date   • Asthma    • Migraine    • Seizures (CMS/HCC)    Rheumatic fever    Past Surgical History:   Procedure Laterality Date   • EAR TUBES     • FINGER SURGERY       Social History:  Works as a maintenance  Also in College  Lives w/ his parents    Family History:  No 1st degree relatives w/ odd or persistent infections    Antibiotic allergies and intolerances:    1. Penicillin - hives (tolerated amoxicillin in the past)    Medications:    Current Outpatient Medications:   •  albuterol sulfate  (90 Base) MCG/ACT inhaler, Inhale 4 puffs As Needed., Disp: , Rfl:   •  azithromycin (Zithromax Z-Carlton) 250 MG tablet, Take 2 tablets the first day, then 1 tablet daily for 4 days., Disp: 6 tablet, Rfl: 0  •  SUMAtriptan (IMITREX) 50 MG tablet, Once at onset of migraine may repeat once in 2 hrs prn, Disp: 20 tablet, Rfl: 0    Review of Systems  All systems were reviewed and are negative unless otherwise stated above in the HPI    Objective   Vital Signs   /78   Pulse 73   Temp 97.3 °F (36.3 °C)   Ht 177.8 cm (70\")   Wt 99.6 kg (219 lb 9.6 oz)   BMI 31.51 kg/m²       Physical Exam:   General: awake, alert, NAD, very nice   Eyes: no scleral icterus  ENT: no tonsillar exudates  Neck: Supple  Cardiovascular: NR  Respiratory: normal work of breathing on " ambient air  GI: Abdomen is soft, non-distended  :  no Mckeon catheter  Musculoskeletal: normal musculature  Skin: rash resolved  Neurological: Alert and oriented x 3, motor strength 5/5 in all four extremities  Psychiatric: Normal mood and affect     Labs:   CBC, CMP, ESR, CRP, micro reviewed today  Lab Results   Component Value Date    WBC 5.53 02/08/2021    HGB 13.7 02/08/2021    HCT 39.2 02/08/2021    MCV 89.7 02/08/2021     02/08/2021       Lab Results   Component Value Date    GLUCOSE 92 02/08/2021    BUN 22 (H) 02/08/2021    CREATININE 1.11 02/08/2021    EGFRIFNONA 84 02/08/2021    BCR 19.8 02/08/2021    CO2 29.7 (H) 02/08/2021    CALCIUM 9.4 02/08/2021    ALBUMIN 4.20 02/08/2021    AST 49 (H) 02/08/2021    ALT 38 02/08/2021     Lab Results   Component Value Date    SEDRATE 8 02/08/2021     Lab Results   Component Value Date    CRP <0.30 02/08/2021       HIV Ab negative  SARS-CoV-2 Ab negative    Microbiology:  11/28/20 BCx: negative  3/18/21 Rapid Strep: positive    Radiology (prior):  TTE EF 50%; no valvular changes    CT chest negative for acute process; old granulomatous disease    Assessment/Plan   1. Rheumatic fever  2. Penicillin allergy - hives  3. Long term use of antibiotics  4. Recent Strep pharynigitis    He had hives with intramuscular penicillin. He was referred to an allergist who confirmed the allergy. I spoke to the allergist by phone after the appointment and we agreed an alternative antibiotic is needed.     The recommendation would be either a sulfonamide or azithromycin. I prefer the latter as it has much lower chance for an allergic reaction than the sulfonamide does.    Therefore we will stop IM penicillin injections and start azithromycin 250 mg PO daily. I'll send him for an updated baseline EKG (last QTc was 406 in December 2020). The plan will still be to continue through December 2025. Keep scheduled follow-up w/ cardiology in July.    RTC 2/8/22 as scheduled or  sooner if needed.

## 2021-04-16 ENCOUNTER — BULK ORDERING (OUTPATIENT)
Dept: CASE MANAGEMENT | Facility: OTHER | Age: 21
End: 2021-04-16

## 2021-04-16 DIAGNOSIS — Z23 IMMUNIZATION DUE: ICD-10-CM

## 2021-04-22 ENCOUNTER — LAB (OUTPATIENT)
Dept: OTHER | Facility: HOSPITAL | Age: 21
End: 2021-04-22

## 2021-04-22 ENCOUNTER — CONSULT (OUTPATIENT)
Dept: ONCOLOGY | Facility: CLINIC | Age: 21
End: 2021-04-22

## 2021-04-22 VITALS
DIASTOLIC BLOOD PRESSURE: 72 MMHG | HEART RATE: 76 BPM | WEIGHT: 223.3 LBS | OXYGEN SATURATION: 97 % | HEIGHT: 70 IN | SYSTOLIC BLOOD PRESSURE: 121 MMHG | TEMPERATURE: 98 F | RESPIRATION RATE: 18 BRPM | BODY MASS INDEX: 31.97 KG/M2

## 2021-04-22 DIAGNOSIS — D84.9 IMMUNODEFICIENCY (HCC): Primary | ICD-10-CM

## 2021-04-22 DIAGNOSIS — D72.819 LEUKOPENIA, UNSPECIFIED TYPE: Primary | ICD-10-CM

## 2021-04-22 DIAGNOSIS — D69.6 THROMBOCYTOPENIA (HCC): ICD-10-CM

## 2021-04-22 DIAGNOSIS — D72.819 LEUKOPENIA, UNSPECIFIED TYPE: ICD-10-CM

## 2021-04-22 LAB
ALBUMIN SERPL-MCNC: 4.4 G/DL (ref 3.5–5.2)
ALBUMIN/GLOB SERPL: 1.8 G/DL
ALP SERPL-CCNC: 75 U/L (ref 39–117)
ALT SERPL W P-5'-P-CCNC: 42 U/L (ref 1–41)
ANION GAP SERPL CALCULATED.3IONS-SCNC: 10.1 MMOL/L (ref 5–15)
AST SERPL-CCNC: 40 U/L (ref 1–40)
BASOPHILS # BLD AUTO: 0.03 10*3/MM3 (ref 0–0.2)
BASOPHILS NFR BLD AUTO: 0.7 % (ref 0–1.5)
BILIRUB SERPL-MCNC: 0.3 MG/DL (ref 0–1.2)
BUN SERPL-MCNC: 18 MG/DL (ref 6–20)
BUN/CREAT SERPL: 17.1 (ref 7–25)
CALCIUM SPEC-SCNC: 8.8 MG/DL (ref 8.6–10.5)
CHLORIDE SERPL-SCNC: 102 MMOL/L (ref 98–107)
CO2 SERPL-SCNC: 28.9 MMOL/L (ref 22–29)
CREAT SERPL-MCNC: 1.05 MG/DL (ref 0.76–1.27)
DEPRECATED RDW RBC AUTO: 38 FL (ref 37–54)
EOSINOPHIL # BLD AUTO: 0.16 10*3/MM3 (ref 0–0.4)
EOSINOPHIL NFR BLD AUTO: 3.5 % (ref 0.3–6.2)
ERYTHROCYTE [DISTWIDTH] IN BLOOD BY AUTOMATED COUNT: 11.9 % (ref 12.3–15.4)
GFR SERPL CREATININE-BSD FRML MDRD: 90 ML/MIN/1.73
GLOBULIN UR ELPH-MCNC: 2.5 GM/DL
GLUCOSE SERPL-MCNC: 105 MG/DL (ref 65–99)
HCT VFR BLD AUTO: 40.9 % (ref 37.5–51)
HGB BLD-MCNC: 14 G/DL (ref 13–17.7)
IMM GRANULOCYTES # BLD AUTO: 0.01 10*3/MM3 (ref 0–0.05)
IMM GRANULOCYTES NFR BLD AUTO: 0.2 % (ref 0–0.5)
LYMPHOCYTES # BLD AUTO: 1.45 10*3/MM3 (ref 0.7–3.1)
LYMPHOCYTES NFR BLD AUTO: 32.1 % (ref 19.6–45.3)
MCH RBC QN AUTO: 29.6 PG (ref 26.6–33)
MCHC RBC AUTO-ENTMCNC: 34.2 G/DL (ref 31.5–35.7)
MCV RBC AUTO: 86.5 FL (ref 79–97)
MONOCYTES # BLD AUTO: 0.42 10*3/MM3 (ref 0.1–0.9)
MONOCYTES NFR BLD AUTO: 9.3 % (ref 5–12)
NEUTROPHILS NFR BLD AUTO: 2.45 10*3/MM3 (ref 1.7–7)
NEUTROPHILS NFR BLD AUTO: 54.2 % (ref 42.7–76)
NRBC BLD AUTO-RTO: 0 /100 WBC (ref 0–0.2)
PLATELET # BLD AUTO: 203 10*3/MM3 (ref 140–450)
PMV BLD AUTO: 9.6 FL (ref 6–12)
POTASSIUM SERPL-SCNC: 3.9 MMOL/L (ref 3.5–5.2)
PROT SERPL-MCNC: 6.9 G/DL (ref 6–8.5)
RBC # BLD AUTO: 4.73 10*6/MM3 (ref 4.14–5.8)
SODIUM SERPL-SCNC: 141 MMOL/L (ref 136–145)
WBC # BLD AUTO: 4.52 10*3/MM3 (ref 3.4–10.8)

## 2021-04-22 PROCEDURE — 36415 COLL VENOUS BLD VENIPUNCTURE: CPT

## 2021-04-22 PROCEDURE — 86235 NUCLEAR ANTIGEN ANTIBODY: CPT | Performed by: INTERNAL MEDICINE

## 2021-04-22 PROCEDURE — 99203 OFFICE O/P NEW LOW 30 MIN: CPT | Performed by: INTERNAL MEDICINE

## 2021-04-22 PROCEDURE — 86038 ANTINUCLEAR ANTIBODIES: CPT | Performed by: INTERNAL MEDICINE

## 2021-04-22 PROCEDURE — 84165 PROTEIN E-PHORESIS SERUM: CPT | Performed by: INTERNAL MEDICINE

## 2021-04-22 PROCEDURE — 82657 ENZYME CELL ACTIVITY: CPT | Performed by: INTERNAL MEDICINE

## 2021-04-22 PROCEDURE — 80053 COMPREHEN METABOLIC PANEL: CPT | Performed by: INTERNAL MEDICINE

## 2021-04-22 PROCEDURE — 85025 COMPLETE CBC W/AUTO DIFF WBC: CPT | Performed by: INTERNAL MEDICINE

## 2021-04-22 PROCEDURE — 86225 DNA ANTIBODY NATIVE: CPT | Performed by: INTERNAL MEDICINE

## 2021-04-22 NOTE — PROGRESS NOTES
.     REASON FOR CONSULTATION:     Provide an opinion on any further workup of leukocytopenia and thrombocytopenia.                               REQUESTING PHYSICIAN: Lobo Evans MD       RECORDS OBTAINED:  Records of the patient's history including those obtained from the referring provider were reviewed and summarized in detail.    HISTORY OF PRESENT ILLNESS:  The patient is a 20 y.o. year old male with history of rheumatic fever diagnosed in 11/2020, history of pneumonia, migraine headache, and seizure disorder who presented today on 4/22/2021 for initial evaluation referred by his allergist, Dr. Evans, for evaluation of leukocytopenia and thrombocytopenia.     According to the patient, he gets infected very easily since his childhood. He was diagnosed of rheumatic fever in 11/2020 and required hospitalization. He was seen by ID service and Cardiology service. He had no evidence of valvular heart disease or carditis. Echocardiogram study was normal. Nevertheless, the patient had positive antistreptolysin antibodies and he had prolonged fever and arthralgia. Patient was seen by ID service Dr. Acharya recommended intramuscular penicillin monthly for 5 years.     This patient had soreness of throat with lymphadenopathy in middle of 03/2021 and was seen by his primary care provider, JACKIE Wolfe, and had positive strep screen test. Because this patient had developed allergic reactions/skin rashes secondary to penicillin after 3 doses, he was given a course of Z-Carlton.     This patient was seen by ID service, Dr. Sammy Acharya, on 04/05/2021. Dr. Acharya actually prescribed 1 month azithromycin with refill to his pharmacy. The patient reports he does not know that and he is not taking Zithromax currently. Nevertheless, the patient reports no fever, sweating or chills. He is not feeling great but not currently significantly ill.    Laboratory studies today on 4/22/2021 reported total WBC  4520 including ANC 2450, lymphocytes 1450 and hemoglobin 14.0, MCV 86.5 and platelets 203,000.     I reviewed his laboratory study results in the Epic system. The earliest available was from 05/20/2018 and he had WBC 2800, hemoglobin 14.2 and platelets 150,000; no differentiation of WBC. On 02/05/2020, he had elevated WBC 11,970 including ANC 9240, lymphocytes 1600, monocytes 980, hemoglobin 14.5, and platelets 205,000.     On 11/28/2020 when the patient had rheumatic fever laboratory study reported total WBC 2330 including ANC 1410, lymphocytes 460, monocytes 430 and platelets 106,000 with hemoglobin 14.8. Labs 2 days later on 11/30/2020 reported similar results with WBC 2650, ANC 1650, lymphocytes 560 and platelets 124,000 with stable hemoglobin of 14.6. Labs on 12/04/2020 showed normalized platelets 165,000, improved WBC 4200 and ANC 2460, lymphocytes 1190 and hemoglobin 13.8.     I also reviewed CBC results in the [facility] system and there is no persistent leukocytopenia or thrombocytopenia.         Past Medical History:   Diagnosis Date   • Asthma    • H/O Rheumatic fever 2021   • History of pneumonia 2016   • Migraine    • Seizures (CMS/[facility])      Past Surgical History:   Procedure Laterality Date   • EAR TUBES     • FINGER SURGERY     • TYMPANOSTOMY TUBE PLACEMENT         MEDICATIONS    Current Outpatient Medications:   •  albuterol sulfate  (90 Base) MCG/ACT inhaler, Inhale 4 puffs As Needed., Disp: , Rfl:   •  azithromycin (ZITHROMAX) 250 MG tablet, Take 1 tablet by mouth Daily for 30 days., Disp: 30 tablet, Rfl: 11  •  SUMAtriptan (IMITREX) 50 MG tablet, Once at onset of migraine may repeat once in 2 hrs prn, Disp: 20 tablet, Rfl: 0    ALLERGIES:     Allergies   Allergen Reactions   • Montelukast Mental Status Change     Increased seizures   • Penicillins Hives     Penicillin IM (2021)       SOCIAL HISTORY:       Social History     Socioeconomic History   • Marital status: Single     Spouse  "name: Not on file   • Number of children: Not on file   • Years of education: Not on file   • Highest education level: Not on file   Tobacco Use   • Smoking status: Never Smoker   • Smokeless tobacco: Never Used   Substance and Sexual Activity   • Alcohol use: No     Comment: Caffeine use: 2-3 cups daily   • Drug use: No   • Sexual activity: Never         FAMILY HISTORY:  Family History   Problem Relation Age of Onset   • Diabetes Paternal Grandmother    • Diabetes Paternal Grandfather    · Father has hypertension    REVIEW OF SYSTEMS:  Review of Systems   Constitutional: Negative for appetite change, fever and unexpected weight change. Diaphoresis: Night sweats.   HENT: Negative for mouth sores and sore throat.    Eyes: Negative for photophobia.   Respiratory: Negative for cough and shortness of breath.    Cardiovascular: Negative for chest pain and leg swelling.   Gastrointestinal: Negative for abdominal pain, anal bleeding, blood in stool, diarrhea and nausea.   Endocrine: Negative for polyphagia.   Genitourinary: Negative for dysuria and hematuria.   Musculoskeletal: Positive for arthralgias. Negative for joint swelling.   Skin: Negative for color change and rash.   Allergic/Immunologic: Negative for environmental allergies.   Neurological: Negative for dizziness and headaches.   Hematological: Negative for adenopathy. Does not bruise/bleed easily.   Psychiatric/Behavioral: Negative for agitation and confusion.              Vitals:    04/22/21 1539   BP: 121/72   Pulse: 76   Resp: 18   Temp: 98 °F (36.7 °C)   TempSrc: Temporal   SpO2: 97%   Weight: 101 kg (223 lb 4.8 oz)   Height: 177.8 cm (70\")   PainSc: 0-No pain     Current Status 4/22/2021   ECOG score 0      PHYSICAL EXAM:      CONSTITUTIONAL:  Vital signs reviewed.  Well-developed well-nourished young male.  No distress, looks comfortable.  EYES:  Conjunctiva and lids unremarkable.  PERRLA  EARS,NOSE,MOUTH,THROAT:  Patient wears mask due to the pandemic " coronavirus infection.   RESPIRATORY:  Normal respiratory effort.  Lungs clear to auscultation bilaterally.  CARDIOVASCULAR: Regular rhythm and rate.  Normal S1, S2.  No murmurs rubs or gallops.  No significant lower extremity edema.  GASTROINTESTINAL: Abdomen appears unremarkable.  Nontender.  No hepatomegaly.  No splenomegaly.  Bowel sounds normal.  LYMPHATIC:  No cervical, supraclavicular, axillary lymphadenopathy.  MUSCULOSKELETAL:  Unremarkable gait and station.  Unremarkable digits/nails.  No cyanosis or clubbing.  SKIN:  Warm.  No rashes.  PSYCHIATRIC:  Normal judgment and insight.  Normal mood and affect.      RECENT LABS:        WBC   Date Value Ref Range Status   04/22/2021 4.52 3.40 - 10.80 10*3/mm3 Final   02/08/2021 5.53 3.40 - 10.80 10*3/mm3 Final   12/04/2020 4.20 (L) 4.5 - 11.0 10*3/uL Final   11/30/2020 2.65 (L) 4.5 - 11.0 10*3/uL Final   11/28/2020 2.33 (L) 3.40 - 10.80 10*3/mm3 Final   02/05/2020 11.97 (H) 3.40 - 10.80 10*3/mm3 Final   05/20/2018 2.8 (L) 4.5 - 11.0 10*3/uL Final     Hemoglobin   Date Value Ref Range Status   04/22/2021 14.0 13.0 - 17.7 g/dL Final   02/08/2021 13.7 13.0 - 17.7 g/dL Final   12/04/2020 13.8 13.5 - 17.5 g/dL Final   11/30/2020 14.6 13.5 - 17.5 g/dL Final   11/28/2020 14.8 13.0 - 17.7 g/dL Final   02/05/2020 14.5 13.0 - 17.7 g/dL Final   05/20/2018 14.2 13.5 - 17.5 g/dL Final     Platelets   Date Value Ref Range Status   04/22/2021 203 140 - 450 10*3/mm3 Final   02/08/2021 204 140 - 450 10*3/mm3 Final   12/04/2020 165 140 - 440 10*3/uL Final   11/30/2020 124 (L) 140 - 440 10*3/uL Final   11/28/2020 106 (L) 140 - 450 10*3/mm3 Final   02/05/2020 205 140 - 450 10*3/mm3 Final   05/20/2018 150 140 - 440 10*3/uL Final       Assessment/Plan     ASSESSMENT:    1. Leukocytopenia and thrombocytopenia. This patient has occasional leukocytopenia and thrombocytopenia and most recently was in 11/2020 associated with his rheumatic fever after assumed streptococcus infection. He had  platelets low at 106,000 on 11/28/2020, ANC 1410, WBC 2330 on the same date. Since that time his WBC and platelets have both normalized as well as neutrophils.     2. Thrombocytopenia. This is more apparent associated with his rheumatic fever in 11/2020. This has resolved.     3. Rheumatic fever.  This was originally diagnosed the November 2020.  He recently was seen by ID service, Dr. Acharya, who prescribed oral Zithromax for 1 month on 04/05/2021 and the patient reports he is not aware of that, so he is not taking Zithromax. I encouraged the patient to get the medicine and start taking it.     4.  Decreased immunity with recurrent bacterial infection. Suspect immunodeficiency. Discussed with the patient today and recommended laboratory studies for evaluation including cellular immunity neutrophil oxidative burst and we will also check his humoral immunity by obtaining serum protein electrophoresis and quantitation of immunoglobulin.     The patient also reports arthralgia. This could be part of the rheumatic fever; nevertheless, I think we should obtain laboratory study for NANDO with titer together with comprehensive NANDO study. Patient is agreeable.     PLAN:   1. Laboratory studies today.   - Protein Electrophoresis, Total  - Comprehensive Metabolic Panel  - Nuclear Antigen Antibody, IFA  - NANDO Comprehensive Panel  - Neutrophil Oxidative Burst    2. Patient should  Zithromax prescription from his pharmacy and start taking it. He should follow up with Dr. Acharya, ID service.   3. I will bring the patient back for reevaluation in 2 weeks. We will discuss lab results and further management option. He voiced understanding.        NOHELIA GAN M.D., Ph.D.    4/22/2021    CC:   MD Brit Spring APRN

## 2021-04-23 LAB
ALBUMIN SERPL ELPH-MCNC: 3.8 G/DL (ref 2.9–4.4)
ALBUMIN/GLOB SERPL: 1.2 {RATIO} (ref 0.7–1.7)
ALPHA1 GLOB SERPL ELPH-MCNC: 0.2 G/DL (ref 0–0.4)
ALPHA2 GLOB SERPL ELPH-MCNC: 0.7 G/DL (ref 0.4–1)
B-GLOBULIN SERPL ELPH-MCNC: 0.9 G/DL (ref 0.7–1.3)
CENTROMERE B AB SER-ACNC: <0.2 AI (ref 0–0.9)
CHROMATIN AB SERPL-ACNC: <0.2 AI (ref 0–0.9)
DSDNA AB SER-ACNC: <1 IU/ML (ref 0–9)
ENA JO1 AB SER-ACNC: <0.2 AI (ref 0–0.9)
ENA RNP AB SER-ACNC: <0.2 AI (ref 0–0.9)
ENA SCL70 AB SER-ACNC: 0.7 AI (ref 0–0.9)
ENA SM AB SER-ACNC: <0.2 AI (ref 0–0.9)
ENA SS-A AB SER-ACNC: <0.2 AI (ref 0–0.9)
ENA SS-B AB SER-ACNC: <0.2 AI (ref 0–0.9)
GAMMA GLOB SERPL ELPH-MCNC: 1.4 G/DL (ref 0.4–1.8)
GLOBULIN SER CALC-MCNC: 3.2 G/DL (ref 2.2–3.9)
LABORATORY COMMENT REPORT: NORMAL
Lab: NORMAL
M PROTEIN SERPL ELPH-MCNC: NORMAL G/DL
NEUTROPHIL OXIDATIVE BURST: NORMAL
PROT SERPL-MCNC: 7 G/DL (ref 6–8.5)
VIABLE CELLS NFR SPEC: NORMAL %

## 2021-04-25 LAB
ANA INTERCELL BRIDGE TITR SER IF: ABNORMAL {TITER}
ANA SPECKLED TITR SER: ABNORMAL {TITER}
ANA TITR SER IF: POSITIVE {TITER}
Lab: ABNORMAL

## 2021-04-26 DIAGNOSIS — D72.819 LEUKOPENIA, UNSPECIFIED TYPE: ICD-10-CM

## 2021-04-26 DIAGNOSIS — D69.6 THROMBOCYTOPENIA (HCC): ICD-10-CM

## 2021-04-26 DIAGNOSIS — D84.9 IMMUNODEFICIENCY (HCC): Primary | ICD-10-CM

## 2021-04-27 ENCOUNTER — LAB (OUTPATIENT)
Dept: LAB | Facility: HOSPITAL | Age: 21
End: 2021-04-27

## 2021-04-27 DIAGNOSIS — D69.6 THROMBOCYTOPENIA (HCC): ICD-10-CM

## 2021-04-27 DIAGNOSIS — D72.819 LEUKOPENIA, UNSPECIFIED TYPE: ICD-10-CM

## 2021-04-27 DIAGNOSIS — D84.9 IMMUNODEFICIENCY (HCC): ICD-10-CM

## 2021-04-27 PROCEDURE — 36415 COLL VENOUS BLD VENIPUNCTURE: CPT

## 2021-04-30 LAB — NEUTROPHIL OXIDATIVE BURST: NORMAL

## 2021-05-05 ENCOUNTER — TELEPHONE (OUTPATIENT)
Dept: ONCOLOGY | Facility: CLINIC | Age: 21
End: 2021-05-05

## 2021-05-05 ENCOUNTER — APPOINTMENT (OUTPATIENT)
Dept: SLEEP MEDICINE | Facility: HOSPITAL | Age: 21
End: 2021-05-05

## 2021-05-05 ENCOUNTER — LAB (OUTPATIENT)
Dept: LAB | Facility: HOSPITAL | Age: 21
End: 2021-05-05

## 2021-05-05 DIAGNOSIS — D72.819 LEUKOPENIA, UNSPECIFIED TYPE: Primary | ICD-10-CM

## 2021-05-05 PROCEDURE — 36415 COLL VENOUS BLD VENIPUNCTURE: CPT

## 2021-05-05 NOTE — TELEPHONE ENCOUNTER
----- Message from Sherry Peng sent at 5/5/2021  8:54 AM EDT -----  Regarding: needs appt. today  This patient needs an appointment today, if possible,  for a special test. The test has to be collected on Mon., Tues. or Wed. only. Dr Mathur wants it today if the patient can come in. Thanks.

## 2021-05-07 ENCOUNTER — APPOINTMENT (OUTPATIENT)
Dept: LAB | Facility: HOSPITAL | Age: 21
End: 2021-05-07

## 2021-05-07 LAB
NEUTROPHIL OXIDATIVE BURST: 482.2
VIABLE CELLS NFR SPEC: 97.8 %

## 2021-06-02 ENCOUNTER — OFFICE VISIT (OUTPATIENT)
Dept: SLEEP MEDICINE | Facility: HOSPITAL | Age: 21
End: 2021-06-02

## 2021-06-02 VITALS
SYSTOLIC BLOOD PRESSURE: 127 MMHG | BODY MASS INDEX: 32.64 KG/M2 | WEIGHT: 228 LBS | HEART RATE: 65 BPM | OXYGEN SATURATION: 99 % | DIASTOLIC BLOOD PRESSURE: 68 MMHG | HEIGHT: 70 IN

## 2021-06-02 DIAGNOSIS — G47.8 SLEEP PARALYSIS: ICD-10-CM

## 2021-06-02 DIAGNOSIS — G47.8 NON-RESTORATIVE SLEEP: ICD-10-CM

## 2021-06-02 DIAGNOSIS — E66.9 OBESITY (BMI 30-39.9): ICD-10-CM

## 2021-06-02 DIAGNOSIS — G47.10 HYPERSOMNIA: Primary | ICD-10-CM

## 2021-06-02 PROCEDURE — G0463 HOSPITAL OUTPT CLINIC VISIT: HCPCS

## 2021-06-02 PROCEDURE — 99203 OFFICE O/P NEW LOW 30 MIN: CPT | Performed by: FAMILY MEDICINE

## 2021-06-02 NOTE — PROGRESS NOTES
[facility] New Patient[facility]ultation       Reason for Consultation: Sleep apnea hypersomnia      Patient Care Team:  Brit Cruz APRN as PCP - General (Nurse Practitioner)  Lobo Evans MD as Referring Physician (Internal Medicine)  Gasper Mathur MD PhD as Consulting Physician (Hematology and Oncology)  Storm Locke MD as Consulting Physician (Sleep Medicine)      History of present illness:  Thank you for asking me to see your patient.  The patient is a 21 y.o. male with past medical history of thrombocytopenia leukocytopenia immunodeficiency asthma presents today with concern for sleep disorder due to hypersomnia nonrestorative sleep.  Belington score of 16 while at his allergist appointment.  Reports that he never sleeps well always drowsy and tired no matter how long he sleeps.  Also reports snoring and very vivid dreams.  No history of prior sleep study or tonsillectomy.    Sleep Schedule:  Bed time: 10 PM weekdays 11 PM weekends  Sleep latency: About half an hour  Wake time: Weekdays 4:55 AM weekends 10 AM  Average hours slept: Weekday 7 weekends 10  Non-restorative sleep: Yes  Number of naps per day: 1  Rotating shifts?:  No  Nocturia: Y  Electronics in bedroom: N    Excessive daytime sleepiness or drowsiness:Y  Any accidents at work due to sleepiness in the last 5 years:N  Any difficulty driving due to sleepiness or being drowsy: N  Weight changed in the last 5 years:Y - GAINED 30 LBS    Snoring:Y  Witnessed apneas:N  Have you ever awakened gasping for breath, coughing, choking or respiratory discomfort: N  Morning headaches: N  Awaken with a sore throat or dry mouth: Y    Any reports of leg jerking at night: N  Urge sensations: N  Does pain disrupt sleep: N  Sweating during sleep: Y  Teeth grinding: N    Any sudden episodes of sleep during the day: N  Sleep paralysis/hallucinations: Y - SLEEP PARALYSIS  Muscle weakness with laughing/anger: Y  Nightmares: Y  Sleep walking:  "N    Are you sleepy when you increase your sleep time: Y  Do you sleep better away from your own bed: N    ESS: 16    Social History: Works as a  no tobacco use 1 alcoholic drink per week 2 coffees a day no drug use    Review of Systems:    A complete review of systems was done and all were negative with the exception of anxiety    Allergies:  Montelukast and Penicillins    Family History: FE no       Current Outpatient Medications:   •  albuterol sulfate  (90 Base) MCG/ACT inhaler, Inhale 4 puffs As Needed., Disp: , Rfl:   •  SUMAtriptan (IMITREX) 50 MG tablet, Once at onset of migraine may repeat once in 2 hrs prn, Disp: 20 tablet, Rfl: 0    Vital Signs:    Vitals:    06/02/21 1505   BP: 127/68   Pulse: 65   SpO2: 99%   Weight: 103 kg (228 lb)   Height: 177.8 cm (70\")      Body mass index is 32.71 kg/m².  Neck Circumference: 17.25 inches      Physical Exam:   General Alert and oriented. No acute distress noted   Pharynx/Throat Class II Mallampati airway, large tongue, no evidence of redundant lateral pharyngeal tissue. No oral lesions. No thrush. Moist mucous membranes.   Head Normocephalic. Symmetrical. Atraumatic.    Nose No septal deviation. No drainage   Chest Wall Normal shape. Symmetric expansion with respiration. No tenderness.   Neck Trachea midline, no thyromegaly or adenopathy    Lungs Clear to auscultation bilaterally. No wheezes. No rhonchi. No rales. Respirations regular, even and unlabored.   Heart Regular rhythm and normal rate. Normal S1 and S2. No murmur   Abdomen Soft, non-tender and non-distended. Normal bowel sounds. No masses.   Extremities Moves all extremities well. No edema   Psychiatric Normal mood and affect.       Impression:  1. Hypersomnia    2. Non-restorative sleep    3. Obesity (BMI 30-39.9)    4. Sleep paralysis        Plan:    Good sleep hygiene measures should be maintained.  Weight loss would be beneficial in this patient who is obese with BMI " 32.7.    I discussed the pathophysiology of obstructive sleep apnea with the patient.  We discussed the adverse outcomes associated with untreated sleep-disordered breathing.  We discussed treatment modalities of obstructive sleep apnea including CPAP device as well as oral mandibular advancement device. Sleep study will be scheduled to establish definitive diagnosis of sleep disorder breathing.  Weight loss will be strongly beneficial in order to reduce the severity of sleep-disordered breathing.  Patient has narrow oropharyngeal structure.  Caution during activities that require prolonged concentration is strongly advised.  Patient will be notified of sleep study results after sleep study is completed.  If sleep apnea is only mild,  oral mandibular advancement device may be one of the treatment options.  However if sleep apnea is moderately severe, CPAP treatment will be strongly encouraged.  The patient is not opposed to treatment with CPAP device if we confirm significant obstructive sleep apnea on polysomnography.     Given h/o sleep paralysis and hypersomnia will do MSLT.     Thank you for allowing me to participate in your patient's care.    Storm Locke MD  Sleep Medicine  06/02/21  15:35 EDT

## 2021-07-08 ENCOUNTER — HOSPITAL ENCOUNTER (OUTPATIENT)
Dept: CARDIOLOGY | Facility: HOSPITAL | Age: 21
Discharge: HOME | End: 2021-07-08
Admitting: INTERNAL MEDICINE

## 2021-07-08 ENCOUNTER — TELEPHONE (OUTPATIENT)
Dept: CARDIOLOGY | Facility: CLINIC | Age: 21
End: 2021-07-08

## 2021-07-08 ENCOUNTER — OFFICE VISIT (OUTPATIENT)
Dept: CARDIOLOGY | Facility: CLINIC | Age: 21
End: 2021-07-08

## 2021-07-08 VITALS
BODY MASS INDEX: 32.58 KG/M2 | HEART RATE: 76 BPM | DIASTOLIC BLOOD PRESSURE: 80 MMHG | WEIGHT: 227.6 LBS | HEIGHT: 70 IN | SYSTOLIC BLOOD PRESSURE: 122 MMHG

## 2021-07-08 DIAGNOSIS — I00 ACUTE RHEUMATIC FEVER: Primary | ICD-10-CM

## 2021-07-08 DIAGNOSIS — I00 ACUTE RHEUMATIC FEVER: ICD-10-CM

## 2021-07-08 LAB
AORTIC ARCH: 2.3 CM
ASCENDING AORTA: 3 CM
BH CV ECHO MEAS - ACS: 2.2 CM
BH CV ECHO MEAS - AO MAX PG (FULL): 1.2 MMHG
BH CV ECHO MEAS - AO MAX PG: 6.6 MMHG
BH CV ECHO MEAS - AO MEAN PG (FULL): 0.94 MMHG
BH CV ECHO MEAS - AO MEAN PG: 4.3 MMHG
BH CV ECHO MEAS - AO ROOT AREA (BSA CORRECTED): 1.5
BH CV ECHO MEAS - AO ROOT AREA: 8.1 CM^2
BH CV ECHO MEAS - AO ROOT DIAM: 3.2 CM
BH CV ECHO MEAS - AO V2 MAX: 128.6 CM/SEC
BH CV ECHO MEAS - AO V2 MEAN: 98 CM/SEC
BH CV ECHO MEAS - AO V2 VTI: 28.2 CM
BH CV ECHO MEAS - ASC AORTA: 3 CM
BH CV ECHO MEAS - AVA(I,A): 3 CM^2
BH CV ECHO MEAS - AVA(I,D): 3 CM^2
BH CV ECHO MEAS - AVA(V,A): 3.4 CM^2
BH CV ECHO MEAS - AVA(V,D): 3.4 CM^2
BH CV ECHO MEAS - BSA(HAYCOCK): 2.3 M^2
BH CV ECHO MEAS - BSA: 2.2 M^2
BH CV ECHO MEAS - BZI_BMI: 32.6 KILOGRAMS/M^2
BH CV ECHO MEAS - BZI_METRIC_HEIGHT: 177.8 CM
BH CV ECHO MEAS - BZI_METRIC_WEIGHT: 103 KG
BH CV ECHO MEAS - EDV(MOD-SP2): 86 ML
BH CV ECHO MEAS - EDV(MOD-SP4): 76 ML
BH CV ECHO MEAS - EDV(TEICH): 113.4 ML
BH CV ECHO MEAS - EF(CUBED): 64.8 %
BH CV ECHO MEAS - EF(MOD-BP): 59 %
BH CV ECHO MEAS - EF(MOD-SP2): 62.8 %
BH CV ECHO MEAS - EF(MOD-SP4): 55.3 %
BH CV ECHO MEAS - EF(TEICH): 56.1 %
BH CV ECHO MEAS - ESV(MOD-SP2): 32 ML
BH CV ECHO MEAS - ESV(MOD-SP4): 34 ML
BH CV ECHO MEAS - ESV(TEICH): 49.8 ML
BH CV ECHO MEAS - FS: 29.4 %
BH CV ECHO MEAS - IVS/LVPW: 0.84
BH CV ECHO MEAS - IVSD: 0.89 CM
BH CV ECHO MEAS - LAT PEAK E' VEL: 11.5 CM/SEC
BH CV ECHO MEAS - LV DIASTOLIC VOL/BSA (35-75): 34.5 ML/M^2
BH CV ECHO MEAS - LV MASS(C)D: 171.5 GRAMS
BH CV ECHO MEAS - LV MASS(C)DI: 77.9 GRAMS/M^2
BH CV ECHO MEAS - LV MAX PG: 5.4 MMHG
BH CV ECHO MEAS - LV MEAN PG: 3.3 MMHG
BH CV ECHO MEAS - LV SYSTOLIC VOL/BSA (12-30): 15.4 ML/M^2
BH CV ECHO MEAS - LV V1 MAX: 116.5 CM/SEC
BH CV ECHO MEAS - LV V1 MEAN: 88.5 CM/SEC
BH CV ECHO MEAS - LV V1 VTI: 22.2 CM
BH CV ECHO MEAS - LVIDD: 4.9 CM
BH CV ECHO MEAS - LVIDS: 3.5 CM
BH CV ECHO MEAS - LVLD AP2: 7.2 CM
BH CV ECHO MEAS - LVLD AP4: 6.8 CM
BH CV ECHO MEAS - LVLS AP2: 6 CM
BH CV ECHO MEAS - LVLS AP4: 5.7 CM
BH CV ECHO MEAS - LVOT AREA (M): 3.8 CM^2
BH CV ECHO MEAS - LVOT AREA: 3.8 CM^2
BH CV ECHO MEAS - LVOT DIAM: 2.2 CM
BH CV ECHO MEAS - LVPWD: 1.1 CM
BH CV ECHO MEAS - MED PEAK E' VEL: 9.6 CM/SEC
BH CV ECHO MEAS - MV A DUR: 0.09 SEC
BH CV ECHO MEAS - MV A MAX VEL: 59.1 CM/SEC
BH CV ECHO MEAS - MV DEC SLOPE: 449.4 CM/SEC^2
BH CV ECHO MEAS - MV DEC TIME: 0.19 SEC
BH CV ECHO MEAS - MV E MAX VEL: 78.2 CM/SEC
BH CV ECHO MEAS - MV E/A: 1.3
BH CV ECHO MEAS - MV MAX PG: 2.7 MMHG
BH CV ECHO MEAS - MV MEAN PG: 1.6 MMHG
BH CV ECHO MEAS - MV P1/2T MAX VEL: 83.4 CM/SEC
BH CV ECHO MEAS - MV P1/2T: 54.4 MSEC
BH CV ECHO MEAS - MV V2 MAX: 81.7 CM/SEC
BH CV ECHO MEAS - MV V2 MEAN: 59.8 CM/SEC
BH CV ECHO MEAS - MV V2 VTI: 23.5 CM
BH CV ECHO MEAS - MVA P1/2T LCG: 2.6 CM^2
BH CV ECHO MEAS - MVA(P1/2T): 4 CM^2
BH CV ECHO MEAS - MVA(VTI): 3.5 CM^2
BH CV ECHO MEAS - PA ACC TIME: 0.13 SEC
BH CV ECHO MEAS - PA MAX PG (FULL): 1.2 MMHG
BH CV ECHO MEAS - PA MAX PG: 3.6 MMHG
BH CV ECHO MEAS - PA PR(ACCEL): 22 MMHG
BH CV ECHO MEAS - PA V2 MAX: 95 CM/SEC
BH CV ECHO MEAS - PULM A REVS DUR: 0.08 SEC
BH CV ECHO MEAS - PULM A REVS VEL: 34.3 CM/SEC
BH CV ECHO MEAS - PULM DIAS VEL: 70.3 CM/SEC
BH CV ECHO MEAS - PULM S/D: 0.68
BH CV ECHO MEAS - PULM SYS VEL: 48 CM/SEC
BH CV ECHO MEAS - PVA(V,A): 2.6 CM^2
BH CV ECHO MEAS - PVA(V,D): 2.6 CM^2
BH CV ECHO MEAS - QP/QS: 0.74
BH CV ECHO MEAS - RAP SYSTOLE: 3 MMHG
BH CV ECHO MEAS - RV MAX PG: 2.5 MMHG
BH CV ECHO MEAS - RV MEAN PG: 1.6 MMHG
BH CV ECHO MEAS - RV V1 MAX: 78.4 CM/SEC
BH CV ECHO MEAS - RV V1 MEAN: 60.7 CM/SEC
BH CV ECHO MEAS - RV V1 VTI: 19.7 CM
BH CV ECHO MEAS - RVOT AREA: 3.1 CM^2
BH CV ECHO MEAS - RVOT DIAM: 2 CM
BH CV ECHO MEAS - RVSP: 23.8 MMHG
BH CV ECHO MEAS - SI(AO): 103.9 ML/M^2
BH CV ECHO MEAS - SI(CUBED): 34.8 ML/M^2
BH CV ECHO MEAS - SI(LVOT): 37.9 ML/M^2
BH CV ECHO MEAS - SI(MOD-SP2): 24.5 ML/M^2
BH CV ECHO MEAS - SI(MOD-SP4): 19.1 ML/M^2
BH CV ECHO MEAS - SI(TEICH): 28.9 ML/M^2
BH CV ECHO MEAS - SUP REN AO DIAM: 2 CM
BH CV ECHO MEAS - SV(AO): 228.9 ML
BH CV ECHO MEAS - SV(CUBED): 76.7 ML
BH CV ECHO MEAS - SV(LVOT): 83.5 ML
BH CV ECHO MEAS - SV(MOD-SP2): 54 ML
BH CV ECHO MEAS - SV(MOD-SP4): 42 ML
BH CV ECHO MEAS - SV(RVOT): 61.7 ML
BH CV ECHO MEAS - SV(TEICH): 63.6 ML
BH CV ECHO MEAS - TAPSE (>1.6): 1.8 CM
BH CV ECHO MEAS - TR MAX VEL: 228 CM/SEC
BH CV ECHO MEASUREMENTS AVERAGE E/E' RATIO: 7.41
BH CV XLRA - RV BASE: 3.7 CM
BH CV XLRA - RV LENGTH: 8.3 CM
BH CV XLRA - RV MID: 3.8 CM
BH CV XLRA - TDI S': 9.3 CM/SEC
LEFT ATRIUM VOLUME INDEX: 17 ML/M2
SINUS: 3.2 CM
STJ: 3.1 CM

## 2021-07-08 PROCEDURE — 93306 TTE W/DOPPLER COMPLETE: CPT

## 2021-07-08 PROCEDURE — 99214 OFFICE O/P EST MOD 30 MIN: CPT | Performed by: INTERNAL MEDICINE

## 2021-07-08 PROCEDURE — 93306 TTE W/DOPPLER COMPLETE: CPT | Performed by: INTERNAL MEDICINE

## 2021-07-08 PROCEDURE — 93000 ELECTROCARDIOGRAM COMPLETE: CPT | Performed by: INTERNAL MEDICINE

## 2021-07-08 NOTE — PROGRESS NOTES
Date of Office Visit: 2021  Encounter Provider: Ghislaine Alcantara MD  Place of Service: [facility] CARDIOLOGY  Patient Name: Hua Medina  :2000      Patient ID:  Hua Medina is a 21 y.o. male is here for  followup for acute rheumatic fever        History of Present Illness    He was here for acute rheumatic fever.  An echocardiogram done 2020 showed ejection 50% with normal RVSP, moderate left ventricular perjury and borderline right ventricular dilation.     He had a fever lasting about 8 days and became more fatigued as well as developing back pain bilateral knee pain and swelling bilateral ankle pain and a rash over his back and shoulders.  He presented to [facility].  he had evidence of inflammation with elevated sed rate and CRP, swelling and pain in his knees and ankles as well as back, fever lasting for 8 days of 102.  By Linton criteria, he met definition for acute rheumatic fever.  He was started on amoxicillin 3000 mg daily which now he is on 1500 mg daily.  He has had no fever since Friday.  His knees feel better but his ankles are still sore.  His back pain is better he still mildly short winded.  He is not felt his heart racing or skipping.  Is had no dizziness or syncope.     He is single and goes to [employer] to train in industrial maintenance.  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  And involves a lot of walking, going up and down ladders.  He likes to lift weights.  He does not smoke, use alcohol or drugs and has 2 to 3 cups of coffee per day.  There is no family history of premature cardiovascular disease.      Labs done 2021 show glucose 25, otherwise normal CMP, normal CBC.  He did not take his antibiotics but has had no fevers, chills, joint aching or swelling.  He has no rashes.  He has no tachycardia, palpitations, dizziness or syncope.  He has no chest pain or pressure.  He is now  "working nights.  He has felt pretty good except for the fatigue is getting used to at night shifts.  He has no orthopnea or PND.    Past Medical History:   Diagnosis Date   • Asthma    • H/O Rheumatic fever 2021   • History of pneumonia 2016   • Migraine    • Seizures (CMS/HCC)          Past Surgical History:   Procedure Laterality Date   • EAR TUBES     • FINGER SURGERY     • TYMPANOSTOMY TUBE PLACEMENT         Current Outpatient Medications on File Prior to Visit   Medication Sig Dispense Refill   • albuterol sulfate  (90 Base) MCG/ACT inhaler Inhale 4 puffs As Needed.     • SUMAtriptan (IMITREX) 50 MG tablet Once at onset of migraine may repeat once in 2 hrs prn 20 tablet 0     No current facility-administered medications on file prior to visit.       Social History     Socioeconomic History   • Marital status: Single     Spouse name: Not on file   • Number of children: Not on file   • Years of education: Not on file   • Highest education level: Not on file   Tobacco Use   • Smoking status: Never Smoker   • Smokeless tobacco: Never Used   Substance and Sexual Activity   • Alcohol use: Yes     Comment: Caffeine use: 2-3 cups daily   • Drug use: No   • Sexual activity: Never           ROS    Procedures    ECG 12 Lead    Date/Time: 7/8/2021 1:19 PM  Performed by: Ghislaine Alcantara MD  Authorized by: Ghislaine Alcantara MD   Comparison: compared with previous ECG   Similar to previous ECG  Rhythm: sinus rhythm    Clinical impression: normal ECG                Objective:      Vitals:    07/08/21 1311 07/08/21 1315   BP: 122/80 122/80   BP Location: Left arm Right arm   Patient Position: Sitting    Cuff Size: Adult    Pulse: 76 76   Weight: 103 kg (227 lb 9.6 oz)    Height: 177.8 cm (70\")      Body mass index is 32.66 kg/m².    Vitals reviewed.   Constitutional:       General: Not in acute distress.     Appearance: Well-developed. Not diaphoretic.   Eyes:      General: No scleral icterus.     " Conjunctiva/sclera: Conjunctivae normal.   HENT:      Head: Normocephalic and atraumatic.   Neck:      Thyroid: No thyromegaly.      Vascular: No carotid bruit or JVD.      Lymphadenopathy: No cervical adenopathy.   Pulmonary:      Effort: Pulmonary effort is normal. No respiratory distress.      Breath sounds: Normal breath sounds. No wheezing. No rhonchi. No rales.   Chest:      Chest wall: Not tender to palpatation.   Cardiovascular:      Normal rate. Regular rhythm.      Murmurs: There is no murmur.      No gallop.   Pulses:     Intact distal pulses.   Edema:     Peripheral edema absent.   Abdominal:      General: Bowel sounds are normal. There is no distension or abdominal bruit.      Palpations: Abdomen is soft. There is no abdominal mass.      Tenderness: There is no abdominal tenderness.   Musculoskeletal:         General: No deformity.      Extremities: No clubbing present.     Cervical back: Neck supple. Skin:     General: Skin is warm and dry. There is no cyanosis.      Coloration: Skin is not pale.      Findings: No rash.   Neurological:      Mental Status: Alert and oriented to person, place, and time.      Cranial Nerves: No cranial nerve deficit.   Psychiatric:         Judgment: Judgment normal.         Lab Review:       Assessment:      Diagnosis Plan   1. Acute rheumatic fever       1. Acute rheumatic fever, symptomatically improved on amoxicillin and aspirin.  Patient really should be treated with benzathine penicillin G 900 mg intramuscularly once followed by benzathine penicillin G 900 mg intramuscularly every 21 to 28 days for 5 years or until the age of 21 which ever comes later, patients without carditis.  Symptomatic management of arthritis arthralgias include aspirin 50-60 mg/kg/day and 4-5 divided doses.  For him that would be about 1000 to 1500 mg 4 times daily of aspirin.  His echo shows no sequelae rheumatic heart complications.  Would recheck echo today. I advised him to call me should  he develop recurrent short windedness, tachycardia, dizziness, decrease in energy level, chest pain, fevers.     Plan:       If his echocardiogram today looks fine, no follow-up is needed and he has no activity restrictions.  Since he is 21, he does not need the benzathine penicillin which he never did take but he has had no symptoms.

## 2021-07-08 NOTE — TELEPHONE ENCOUNTER
Voicemail left with results and request for return call for any questions or concerns.    Thank you,  Keisha Howell RN  [facility]  Triage

## 2021-07-30 ENCOUNTER — OFFICE VISIT (OUTPATIENT)
Dept: FAMILY MEDICINE CLINIC | Facility: CLINIC | Age: 21
End: 2021-07-30

## 2021-07-30 VITALS
HEART RATE: 61 BPM | TEMPERATURE: 97.8 F | DIASTOLIC BLOOD PRESSURE: 82 MMHG | OXYGEN SATURATION: 98 % | SYSTOLIC BLOOD PRESSURE: 114 MMHG

## 2021-07-30 DIAGNOSIS — R05.9 COUGH: Primary | ICD-10-CM

## 2021-07-30 PROCEDURE — 99213 OFFICE O/P EST LOW 20 MIN: CPT | Performed by: NURSE PRACTITIONER

## 2021-07-30 RX ORDER — ALBUTEROL SULFATE 90 UG/1
4 AEROSOL, METERED RESPIRATORY (INHALATION) AS NEEDED
Qty: 18 G | Refills: 0 | Status: SHIPPED | OUTPATIENT
Start: 2021-07-30

## 2021-07-31 LAB
LABCORP SARS-COV-2, NAA 2 DAY TAT: NORMAL
SARS-COV-2 RNA RESP QL NAA+PROBE: NOT DETECTED

## 2022-01-05 ENCOUNTER — OFFICE VISIT (OUTPATIENT)
Dept: FAMILY MEDICINE CLINIC | Facility: CLINIC | Age: 22
End: 2022-01-05

## 2022-01-05 VITALS
HEART RATE: 77 BPM | OXYGEN SATURATION: 97 % | WEIGHT: 227.07 LBS | TEMPERATURE: 97.5 F | RESPIRATION RATE: 12 BRPM | SYSTOLIC BLOOD PRESSURE: 120 MMHG | HEIGHT: 70 IN | DIASTOLIC BLOOD PRESSURE: 60 MMHG | BODY MASS INDEX: 32.51 KG/M2

## 2022-01-05 DIAGNOSIS — J20.9 ACUTE BRONCHITIS, UNSPECIFIED ORGANISM: Primary | ICD-10-CM

## 2022-01-05 PROCEDURE — 99214 OFFICE O/P EST MOD 30 MIN: CPT | Performed by: NURSE PRACTITIONER

## 2022-01-05 RX ORDER — CEFDINIR 300 MG/1
300 CAPSULE ORAL
COMMUNITY
Start: 2021-12-29 | End: 2022-01-08

## 2022-01-05 NOTE — PROGRESS NOTES
"Chief Complaint  No chief complaint on file.    Suki Medina presents to [facility]  Pleasant gentleman here today complains of cough congestion sore throat some achiness started last Thursday, he said no chest pain no shortness of breath no lethargy weakness or fever equivalents.  He is without loss of taste or smell  No known exposure to Covid he is not vaccinated against Covid generally healthy although he was diagnosed with acute rheumatic fever last year but his echocardiogram was normal and he has no known residual cardiac issues.  Social history  No tobacco abuse no drug abuse no alcohol abuse  Past medical history as above.  Went to the [facility] last week was given Omnicef,  Negative antibody test at that time he had a negative test at home as well.        Objective   Vital Signs:   /60   Pulse 77   Temp 97.5 °F (36.4 °C) (Infrared)   Resp 12   Ht 177.8 cm (70\")   Wt 103 kg (227 lb 1.2 oz)   SpO2 97%   BMI 32.58 kg/m²     Physical Exam  Vitals reviewed.   Constitutional:       Appearance: He is well-developed.   HENT:      Head: Normocephalic.      Nose: Nose normal.   Eyes:      General: No scleral icterus.     Conjunctiva/sclera: Conjunctivae normal.      Pupils: Pupils are equal, round, and reactive to light.   Neck:      Thyroid: No thyromegaly.      Vascular: No JVD.   Cardiovascular:      Rate and Rhythm: Normal rate and regular rhythm.      Heart sounds: Normal heart sounds. No murmur heard.  No friction rub. No gallop.    Pulmonary:      Effort: Pulmonary effort is normal. No respiratory distress.      Breath sounds: Normal breath sounds. No stridor. No wheezing or rales.   Abdominal:      Comments: No hepatosplenomegaly, no ascites,   Musculoskeletal:         General: No tenderness.      Cervical back: Neck supple.   Lymphadenopathy:      Cervical: No cervical adenopathy.   Skin:     General: Skin is warm and dry.      " Findings: No erythema or rash.   Neurological:      Mental Status: He is alert and oriented to person, place, and time.      Deep Tendon Reflexes: Reflexes are normal and symmetric.   Psychiatric:         Behavior: Behavior normal.         Thought Content: Thought content normal.         Judgment: Judgment normal.        Result Review :                 Assessment and Plan    Diagnoses and all orders for this visit:    1. Acute bronchitis, unspecified organism (Primary)        Follow Up   Return Return to work note please for Monday thank you.  Patient was given instructions and counseling regarding his condition or for health maintenance advice. Please see specific information pulled into the AVS if appropriate.     Discharge instructions  You have a viral infection, bronchitis, could be Covid cannot rule out, Covid test with back tomorrow  A negative test will not rule out Covid however  Push fluids plenty rest  Ibuprofen 800 mg 3 times a day as needed for body aches or fever  If high fever chest pain shortness of breath weakness lethargy emergency room  You may return to work Monday as long as you are feeling better and without fever for 24 hours

## 2022-01-05 NOTE — PATIENT INSTRUCTIONS
Discharge instructions  You have a viral infection, bronchitis, could be Covid cannot rule out, Covid test with back tomorrow  A negative test will not rule out Covid however  Push fluids plenty rest  Ibuprofen 800 mg 3 times a day as needed for body aches or fever  If high fever chest pain shortness of breath weakness lethargy emergency room  You may return to work Monday as long as you are feeling better and without fever for 24 hours      Mucinex for phlegm if needed  Mucinex DM for phlegm and cough    Menthol and saline as discussed if needed sinus  If increased sinus congestion uncomfortable then temporarily you can take some Afrin no more than 3 days maximum          Magdi update Monday

## 2022-01-06 LAB
LABCORP SARS-COV-2, NAA 2 DAY TAT: NORMAL
SARS-COV-2 RNA RESP QL NAA+PROBE: NOT DETECTED

## 2022-11-22 ENCOUNTER — TELEPHONE (OUTPATIENT)
Dept: FAMILY MEDICINE CLINIC | Facility: CLINIC | Age: 22
End: 2022-11-22

## 2022-11-22 NOTE — TELEPHONE ENCOUNTER
Caller: Hua Medina    Relationship to patient: Self    Best call back number: [phone]     Patient is needing: I SCHEDULED THIS PATIENT WITH DR. NOLAND TOMORROW.  I REALIZED AFTER THE FACT THAT I PROBABLY SHOULD NOT HAVE.  WOULD YOU CHECK TO SEE IF IT IS OK OR SHOULD I CALL PATIENT BACK TO RESCHEDULE?    THANK YOU

## 2022-11-23 ENCOUNTER — OFFICE VISIT (OUTPATIENT)
Dept: FAMILY MEDICINE CLINIC | Facility: CLINIC | Age: 22
End: 2022-11-23

## 2022-11-23 VITALS
HEART RATE: 75 BPM | HEIGHT: 70 IN | DIASTOLIC BLOOD PRESSURE: 80 MMHG | TEMPERATURE: 97.8 F | SYSTOLIC BLOOD PRESSURE: 120 MMHG | OXYGEN SATURATION: 97 % | BODY MASS INDEX: 34.37 KG/M2 | WEIGHT: 240.1 LBS | RESPIRATION RATE: 16 BRPM

## 2022-11-23 DIAGNOSIS — R05.1 ACUTE COUGH: Primary | ICD-10-CM

## 2022-11-23 PROCEDURE — 99213 OFFICE O/P EST LOW 20 MIN: CPT | Performed by: FAMILY MEDICINE

## 2022-11-23 RX ORDER — BENZONATATE 100 MG/1
100 CAPSULE ORAL 3 TIMES DAILY PRN
Qty: 30 CAPSULE | Refills: 0 | Status: SHIPPED | OUTPATIENT
Start: 2022-11-23

## 2022-11-23 RX ORDER — OSELTAMIVIR PHOSPHATE 75 MG/1
75 CAPSULE ORAL 2 TIMES DAILY
Qty: 10 CAPSULE | Refills: 0 | Status: SHIPPED | OUTPATIENT
Start: 2022-11-23

## 2022-11-23 NOTE — PROGRESS NOTES
"Chief Complaint  Chief Complaint   Patient presents with   • Cough   • URI          • Fever     Pt states started yesterday morning        Subjective    History of Present Illness        Hua Medina presents to [facility] for   History of Present Illness  The patient states that he had a fever of 103 degrees with chills last night. He had chest pain with coughing and congestion the morning of 11/22/2022. He reports body aches last night.     The patient has a history of tympanostomy.       Objective   Vital Signs:   Visit Vitals  /80 (BP Location: Left arm, Patient Position: Sitting, Cuff Size: Large Adult)   Pulse 75   Temp 97.8 °F (36.6 °C) (Temporal)   Resp 16   Ht 177.8 cm (70\")   Wt 109 kg (240 lb 1.6 oz)   SpO2 97%   BMI 34.45 kg/m²       BMI is >= 30 and <35. (Class 1 Obesity). The following options were offered after discussion;: weight loss educational material (shared in after visit summary)     Physical Exam  Vitals reviewed.   Constitutional:       Appearance: He is well-developed.   HENT:      Head: Normocephalic and atraumatic.      Right Ear: External ear normal.      Left Ear: External ear normal.      Nose: Nose normal.      Mouth/Throat:      Pharynx: Posterior oropharyngeal erythema present.   Eyes:      General: Lids are normal.      Conjunctiva/sclera: Conjunctivae normal.      Pupils: Pupils are equal, round, and reactive to light.   Cardiovascular:      Rate and Rhythm: Normal rate and regular rhythm.      Pulses: Normal pulses.      Heart sounds: Normal heart sounds.   Pulmonary:      Effort: Pulmonary effort is normal. No respiratory distress.      Breath sounds: Normal breath sounds.   Abdominal:      General: Bowel sounds are normal.      Palpations: Abdomen is soft.   Musculoskeletal:         General: Normal range of motion.      Cervical back: Normal range of motion and neck supple.   Skin:     General: Skin is warm and dry.      Capillary Refill: " Capillary refill takes less than 2 seconds.   Neurological:      Mental Status: He is alert and oriented to person, place, and time.   Psychiatric:         Behavior: Behavior normal.         Thought Content: Thought content normal.         Judgment: Judgment normal.              Result Review :                      Assessment and Plan      Diagnoses and all orders for this visit:    1. Acute cough (Primary)  Assessment & Plan:  - The patient was advised to take over-the-counter cough or cold medication for fever. He was instructed to alternate with ibuprofen if fever persists.   - The patient will begin Tamiflu 1 capsule twice daily for 5 days.  He will take Tessalon Perles 3 times a day as needed for cough.    Orders:  -     oseltamivir (TAMIFLU) 75 MG capsule; Take 1 capsule by mouth 2 (Two) Times a Day.  Dispense: 10 capsule; Refill: 0  -     benzonatate (Tessalon Perles) 100 MG capsule; Take 1 capsule by mouth 3 (Three) Times a Day As Needed for Cough.  Dispense: 30 capsule; Refill: 0           Follow Up   No follow-ups on file.  Patient was given instructions and counseling regarding his condition or for health maintenance advice. Please see specific information pulled into the AVS if appropriate.     Transcribed from ambient dictation for Romain Muse Sr, MD by Chelsie Zurita.  11/23/22   16:44 EST    Patient or patient representative verbalized consent to the visit recording.  I have personally performed the services described in this document as transcribed by the above individual, and it is both accurate and complete.

## 2022-11-23 NOTE — ASSESSMENT & PLAN NOTE
- The patient was advised to take over-the-counter cough or cold medication for fever. He was instructed to alternate with ibuprofen if fever persists.   - The patient will begin Tamiflu 1 capsule twice daily for 5 days.  He will take Tessalon Perles 3 times a day as needed for cough.

## 2024-07-01 ENCOUNTER — TELEPHONE (OUTPATIENT)
Dept: FAMILY MEDICINE CLINIC | Facility: CLINIC | Age: 24
End: 2024-07-01
Payer: COMMERCIAL

## 2024-07-01 NOTE — TELEPHONE ENCOUNTER
----- Message from [facility] sent at 6/30/2024  8:57 PM EDT -----  Regarding: Appointment Request  Contact: [phone]  Appointment Request From: Hua Medina    With Provider: Brit Cruz [[facility]]    Preferred Date Range: 7/1/2024 - 7/5/2024    Preferred Times: Monday Afternoon, Tuesday Afternoon, Wednesday Afternoon, Friday Afternoon    Reason for visit: Annual Physical    Comments:  Lump in my pelvic area noticed recently     : CALLED PT AND SCHEDULED APPT FOR 7/2/24

## 2024-07-02 ENCOUNTER — OFFICE VISIT (OUTPATIENT)
Dept: FAMILY MEDICINE CLINIC | Facility: CLINIC | Age: 24
End: 2024-07-02
Payer: COMMERCIAL

## 2024-07-02 VITALS
SYSTOLIC BLOOD PRESSURE: 123 MMHG | BODY MASS INDEX: 30.06 KG/M2 | OXYGEN SATURATION: 99 % | RESPIRATION RATE: 16 BRPM | DIASTOLIC BLOOD PRESSURE: 69 MMHG | HEIGHT: 70 IN | WEIGHT: 210 LBS | HEART RATE: 54 BPM

## 2024-07-02 DIAGNOSIS — Z00.00 ROUTINE GENERAL MEDICAL EXAMINATION AT A HEALTH CARE FACILITY: Primary | ICD-10-CM

## 2024-07-02 DIAGNOSIS — Z11.3 SCREEN FOR SEXUALLY TRANSMITTED DISEASES: ICD-10-CM

## 2024-07-02 DIAGNOSIS — R59.0 LAD (LYMPHADENOPATHY), INGUINAL: ICD-10-CM

## 2024-07-02 PROCEDURE — 99395 PREV VISIT EST AGE 18-39: CPT | Performed by: NURSE PRACTITIONER

## 2024-07-02 RX ORDER — IBUPROFEN 400 MG/1
400 TABLET ORAL
COMMUNITY

## 2024-07-02 RX ORDER — FLUTICASONE PROPIONATE 50 MCG
1 SPRAY, SUSPENSION (ML) NASAL DAILY
COMMUNITY
Start: 2024-01-22

## 2024-07-04 LAB
ALBUMIN SERPL-MCNC: 4.8 G/DL (ref 4.3–5.2)
ALP SERPL-CCNC: 65 IU/L (ref 44–121)
ALT SERPL-CCNC: 26 IU/L (ref 0–44)
AST SERPL-CCNC: 24 IU/L (ref 0–40)
BILIRUB SERPL-MCNC: 0.5 MG/DL (ref 0–1.2)
BUN SERPL-MCNC: 16 MG/DL (ref 6–20)
BUN/CREAT SERPL: 15 (ref 9–20)
C TRACH RRNA SPEC QL NAA+PROBE: NEGATIVE
CALCIUM SERPL-MCNC: 9.9 MG/DL (ref 8.7–10.2)
CHLORIDE SERPL-SCNC: 99 MMOL/L (ref 96–106)
CHOLEST SERPL-MCNC: 137 MG/DL (ref 100–199)
CO2 SERPL-SCNC: 28 MMOL/L (ref 20–29)
CREAT SERPL-MCNC: 1.1 MG/DL (ref 0.76–1.27)
EGFRCR SERPLBLD CKD-EPI 2021: 96 ML/MIN/1.73
ERYTHROCYTE [DISTWIDTH] IN BLOOD BY AUTOMATED COUNT: 11.8 % (ref 11.6–15.4)
GLOBULIN SER CALC-MCNC: 2.6 G/DL (ref 1.5–4.5)
GLUCOSE SERPL-MCNC: 89 MG/DL (ref 70–99)
HCT VFR BLD AUTO: 44.6 % (ref 37.5–51)
HCV IGG SERPL QL IA: NON REACTIVE
HDLC SERPL-MCNC: 43 MG/DL
HGB BLD-MCNC: 14.9 G/DL (ref 13–17.7)
HIV 1+2 AB+HIV1 P24 AG SERPL QL IA: NON REACTIVE
LDLC SERPL CALC-MCNC: 78 MG/DL (ref 0–99)
LDLC/HDLC SERPL: 1.8 RATIO (ref 0–3.6)
MCH RBC QN AUTO: 30.3 PG (ref 26.6–33)
MCHC RBC AUTO-ENTMCNC: 33.4 G/DL (ref 31.5–35.7)
MCV RBC AUTO: 91 FL (ref 79–97)
N GONORRHOEA RRNA SPEC QL NAA+PROBE: NEGATIVE
PLATELET # BLD AUTO: 192 X10E3/UL (ref 150–450)
POTASSIUM SERPL-SCNC: 4.2 MMOL/L (ref 3.5–5.2)
PROT SERPL-MCNC: 7.4 G/DL (ref 6–8.5)
RBC # BLD AUTO: 4.91 X10E6/UL (ref 4.14–5.8)
RPR SER QL: NON REACTIVE
SODIUM SERPL-SCNC: 140 MMOL/L (ref 134–144)
TRIGL SERPL-MCNC: 82 MG/DL (ref 0–149)
TSH SERPL DL<=0.005 MIU/L-ACNC: 1.68 UIU/ML (ref 0.45–4.5)
VLDLC SERPL CALC-MCNC: 16 MG/DL (ref 5–40)
WBC # BLD AUTO: 4.6 X10E3/UL (ref 3.4–10.8)

## 2024-07-12 NOTE — PROGRESS NOTES
"Chief Complaint  Mass (Pt found lump on pelvic area on the left side)    Subjective        Hua Medina presents to [facility]  History of Present Illness    History of Present Illness  Pt here for physical and mass recently noted left groin.  He noticed it when recently taking shower.  He is not sure what caused it or if it is a hernia.  He is active and exercises regularly.  He is engaged to be  and monogamous.  Denies any STD or STD risk.  He has no penile or scrotal lesions.  No injuries, bites, stings, rash to left leg.  He otherwise is feeling very well.  He denies dysuria, hematuria, melena.  No recent illnesses.    Allergies have been controlled with Flonase as needed.  He has not used his albuterol inhaler for asthma or wheezing in quite a while.  Thinks he has grown out of it.    Exercising regularly and eating healthier.  Denies any other health concerns.    A review of systems was performed, and the pertinent positives are noted in the HPI.            Objective   Vital Signs:  /69   Pulse 54   Resp 16   Ht 177.8 cm (70\")   Wt 95.3 kg (210 lb)   SpO2 99%   BMI 30.13 kg/m²   Estimated body mass index is 30.13 kg/m² as calculated from the following:    Height as of this encounter: 177.8 cm (70\").    Weight as of this encounter: 95.3 kg (210 lb).       BMI is >= 30 and <35. (Class 1 Obesity). The following options were offered after discussion;: Information on healthy weight added to patient's after visit summary.      Physical Exam  Vitals and nursing note reviewed.   Constitutional:       General: He is not in acute distress.     Appearance: He is well-developed. He is not ill-appearing or diaphoretic.   HENT:      Head: Normocephalic and atraumatic.   Eyes:      General:         Right eye: No discharge.         Left eye: No discharge.      Conjunctiva/sclera: Conjunctivae normal.   Cardiovascular:      Rate and Rhythm: Normal rate and regular rhythm. "   Pulmonary:      Effort: Pulmonary effort is normal.      Breath sounds: Normal breath sounds.   Abdominal:      General: Bowel sounds are normal. There is no distension.      Palpations: Abdomen is soft.      Tenderness: There is no abdominal tenderness.   Genitourinary:     Penis: Normal.       Testes: Normal.      Comments: Left inguinal LAD  Musculoskeletal:         General: No deformity.      Comments: Gait smooth and steady   Skin:     General: Skin is warm and dry.   Neurological:      Mental Status: He is alert and oriented to person, place, and time.   Psychiatric:         Mood and Affect: Mood normal.         Behavior: Behavior normal.          Physical Exam       Result Review :            Results                  Assessment and Plan     Diagnoses and all orders for this visit:    1. Routine general medical examination at a health care facility (Primary)  -     CBC (No Diff)  -     Comprehensive Metabolic Panel  -     Lipid Panel With LDL / HDL Ratio  -     TSH Rfx On Abnormal To Free T4    2. LAD (lymphadenopathy), inguinal    3. Screen for sexually transmitted diseases  -     Chlamydia trachomatis, Neisseria gonorrhoeae, PCR - , Urine, Clean Catch  -     RPR  -     HIV-1 / O / 2 Ag / Antibody  -     Hepatitis C Antibody        Assessment & Plan  Appropriate health maintenance and prevention topics specific for this patient were discussed today.  Additionally, health goals, and health concerns addressed as appropriate.  Pt was encouraged to stay up to date on recommended screenings and vaccines based on USPSTF guidelines.      Unclear etiology for left inguinal LAD.  Patient does have what appear to be some scattered insect bites on left leg that he was unaware of.  No known ticks.  No evidence of STD or penile lesions.  Exam was benign.  Will have patient monitor her LAD and return to clinic if not resolved in about 6 weeks.  Certainly if worsening he will let me know.            Follow Up     No  follow-ups on file.  Patient was given instructions and counseling regarding his condition or for health maintenance advice. Please see specific information pulled into the AVS if appropriate.    Patient or patient representative verbalized consent for the use of Ambient Listening during the visit with  JACKIE King for chart documentation. 7/16/2024  06:05 EDT